# Patient Record
Sex: MALE | Race: BLACK OR AFRICAN AMERICAN | NOT HISPANIC OR LATINO | URBAN - METROPOLITAN AREA
[De-identification: names, ages, dates, MRNs, and addresses within clinical notes are randomized per-mention and may not be internally consistent; named-entity substitution may affect disease eponyms.]

---

## 2017-01-02 ENCOUNTER — EMERGENCY (EMERGENCY)
Facility: HOSPITAL | Age: 32
LOS: 0 days | Discharge: HOME | End: 2017-01-02
Attending: EMERGENCY MEDICINE
Payer: COMMERCIAL

## 2017-01-02 VITALS
TEMPERATURE: 98 F | DIASTOLIC BLOOD PRESSURE: 92 MMHG | RESPIRATION RATE: 17 BRPM | HEART RATE: 75 BPM | HEIGHT: 74 IN | SYSTOLIC BLOOD PRESSURE: 153 MMHG | OXYGEN SATURATION: 100 % | WEIGHT: 244.93 LBS

## 2017-01-02 VITALS
DIASTOLIC BLOOD PRESSURE: 99 MMHG | TEMPERATURE: 98 F | SYSTOLIC BLOOD PRESSURE: 151 MMHG | HEART RATE: 70 BPM | OXYGEN SATURATION: 100 % | RESPIRATION RATE: 17 BRPM

## 2017-01-02 DIAGNOSIS — M54.30 SCIATICA, UNSPECIFIED SIDE: ICD-10-CM

## 2017-01-02 DIAGNOSIS — M54.9 DORSALGIA, UNSPECIFIED: ICD-10-CM

## 2017-01-02 LAB
APPEARANCE UR: CLEAR — SIGNIFICANT CHANGE UP
BACTERIA # UR AUTO: ABNORMAL
BILIRUB UR-MCNC: NEGATIVE — SIGNIFICANT CHANGE UP
COLOR SPEC: YELLOW — SIGNIFICANT CHANGE UP
COMMENT - URINE: SIGNIFICANT CHANGE UP
DIFF PNL FLD: ABNORMAL
EPI CELLS # UR: SIGNIFICANT CHANGE UP
GLUCOSE UR QL: NEGATIVE MG/DL — SIGNIFICANT CHANGE UP
HYALINE CASTS # UR AUTO: ABNORMAL /LPF
KETONES UR-MCNC: NEGATIVE — SIGNIFICANT CHANGE UP
LEUKOCYTE ESTERASE UR-ACNC: NEGATIVE — SIGNIFICANT CHANGE UP
NITRITE UR-MCNC: NEGATIVE — SIGNIFICANT CHANGE UP
PH UR: 6 — SIGNIFICANT CHANGE UP (ref 4.8–8)
PROT UR-MCNC: NEGATIVE MG/DL — SIGNIFICANT CHANGE UP
RBC CASTS # UR COMP ASSIST: SIGNIFICANT CHANGE UP /HPF (ref 0–4)
SP GR SPEC: 1.02 — SIGNIFICANT CHANGE UP (ref 1.01–1.02)
UROBILINOGEN FLD QL: NEGATIVE MG/DL — SIGNIFICANT CHANGE UP
WBC UR QL: SIGNIFICANT CHANGE UP

## 2017-01-02 PROCEDURE — 99285 EMERGENCY DEPT VISIT HI MDM: CPT

## 2017-01-02 PROCEDURE — 74176 CT ABD & PELVIS W/O CONTRAST: CPT | Mod: 26

## 2017-01-02 RX ORDER — DIAZEPAM 5 MG
1 TABLET ORAL
Qty: 7 | Refills: 0 | OUTPATIENT
Start: 2017-01-02 | End: 2017-01-09

## 2017-01-02 RX ORDER — KETOROLAC TROMETHAMINE 30 MG/ML
60 SYRINGE (ML) INJECTION ONCE
Qty: 0 | Refills: 0 | Status: DISCONTINUED | OUTPATIENT
Start: 2017-01-02 | End: 2017-01-02

## 2017-01-02 RX ORDER — ACETAMINOPHEN 500 MG
1 TABLET ORAL
Qty: 21 | Refills: 0 | OUTPATIENT
Start: 2017-01-02 | End: 2017-01-09

## 2017-01-02 RX ADMIN — Medication 60 MILLIGRAM(S): at 07:00

## 2017-01-02 RX ADMIN — Medication 60 MILLIGRAM(S): at 05:27
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
## 2017-01-02 NOTE — ED PROVIDER NOTE - MEDICAL DECISION MAKING DETAILS
Patient with sciatica. Labs and imaging reviewed. Patient received toradol and valium with improvement of symptoms. Patient is now discharged with care instructions. Patient is to follow up with pmd. Discussed results and outcome of testing with the patient.  Patient advised to please follow up with their primary care doctor within the next 24 hours and return to the Emergency Department for worsening symptoms or any other concerns.  Patient advised that their doctor may call  to follow up on the specific results of the tests performed today in the emergency department.
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
## 2017-01-03 LAB
CULTURE RESULTS: SIGNIFICANT CHANGE UP
SPECIMEN SOURCE: SIGNIFICANT CHANGE UP

## 2017-05-19 NOTE — ED PROVIDER NOTE - CARDIAC, MLM
Post Op Day 1 Ortho Note    Status Post Nerve Block:  Type of Nerve Block: Left Fascia Iliaca  Single Injection Nerve Block    Post op review: No evidence of immediate block related complications, No paresthesia noted, Able to plantar flex foot, Able to do Normal rate, regular rhythm.  Heart sounds S1, S2.  No murmurs, rubs or gallops.

## 2017-09-29 ENCOUNTER — OUTPATIENT (OUTPATIENT)
Dept: OUTPATIENT SERVICES | Facility: HOSPITAL | Age: 32
LOS: 1 days | End: 2017-09-29
Payer: COMMERCIAL

## 2017-09-29 VITALS
TEMPERATURE: 99 F | HEART RATE: 69 BPM | DIASTOLIC BLOOD PRESSURE: 98 MMHG | HEIGHT: 73 IN | SYSTOLIC BLOOD PRESSURE: 142 MMHG | WEIGHT: 248.24 LBS | RESPIRATION RATE: 16 BRPM

## 2017-09-29 DIAGNOSIS — T14.8 OTHER INJURY OF UNSPECIFIED BODY REGION: ICD-10-CM

## 2017-09-29 DIAGNOSIS — I10 ESSENTIAL (PRIMARY) HYPERTENSION: ICD-10-CM

## 2017-09-29 DIAGNOSIS — S62.614A DISPLACED FRACTURE OF PROXIMAL PHALANX OF RIGHT RING FINGER, INITIAL ENCOUNTER FOR CLOSED FRACTURE: ICD-10-CM

## 2017-09-29 DIAGNOSIS — Z98.890 OTHER SPECIFIED POSTPROCEDURAL STATES: Chronic | ICD-10-CM

## 2017-09-29 LAB
BUN SERPL-MCNC: 8 MG/DL — SIGNIFICANT CHANGE UP (ref 7–23)
CALCIUM SERPL-MCNC: 9.4 MG/DL — SIGNIFICANT CHANGE UP (ref 8.4–10.5)
CHLORIDE SERPL-SCNC: 104 MMOL/L — SIGNIFICANT CHANGE UP (ref 98–107)
CO2 SERPL-SCNC: 25 MMOL/L — SIGNIFICANT CHANGE UP (ref 22–31)
CREAT SERPL-MCNC: 0.84 MG/DL — SIGNIFICANT CHANGE UP (ref 0.5–1.3)
GLUCOSE SERPL-MCNC: 82 MG/DL — SIGNIFICANT CHANGE UP (ref 70–99)
HCT VFR BLD CALC: 43.2 % — SIGNIFICANT CHANGE UP (ref 39–50)
HGB BLD-MCNC: 13.6 G/DL — SIGNIFICANT CHANGE UP (ref 13–17)
MCHC RBC-ENTMCNC: 26 PG — LOW (ref 27–34)
MCHC RBC-ENTMCNC: 31.5 % — LOW (ref 32–36)
MCV RBC AUTO: 82.4 FL — SIGNIFICANT CHANGE UP (ref 80–100)
NRBC # FLD: 0 — SIGNIFICANT CHANGE UP
PLATELET # BLD AUTO: 277 K/UL — SIGNIFICANT CHANGE UP (ref 150–400)
PMV BLD: 9.7 FL — SIGNIFICANT CHANGE UP (ref 7–13)
POTASSIUM SERPL-MCNC: 3.8 MMOL/L — SIGNIFICANT CHANGE UP (ref 3.5–5.3)
POTASSIUM SERPL-SCNC: 3.8 MMOL/L — SIGNIFICANT CHANGE UP (ref 3.5–5.3)
RBC # BLD: 5.24 M/UL — SIGNIFICANT CHANGE UP (ref 4.2–5.8)
RBC # FLD: 13.8 % — SIGNIFICANT CHANGE UP (ref 10.3–14.5)
SODIUM SERPL-SCNC: 141 MMOL/L — SIGNIFICANT CHANGE UP (ref 135–145)
WBC # BLD: 6.33 K/UL — SIGNIFICANT CHANGE UP (ref 3.8–10.5)
WBC # FLD AUTO: 6.33 K/UL — SIGNIFICANT CHANGE UP (ref 3.8–10.5)

## 2017-09-29 PROCEDURE — 93010 ELECTROCARDIOGRAM REPORT: CPT
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
## 2017-09-29 NOTE — H&P PST ADULT - PROBLEM SELECTOR PLAN 1
Scheduled for open reduction internal fixation on  10/2/17.   Labs pending,  EKG in chart,  preop instructions provided to pt.

## 2017-09-29 NOTE — H&P PST ADULT - ASSESSMENT
32yo male presents with right ring finger proximal phalanx fracture, scheduled for open reduction internal fixation on  10/2/17

## 2017-09-29 NOTE — H&P PST ADULT - NEGATIVE ENMT SYMPTOMS
no nasal congestion/no recurrent cold sores/no sinus symptoms/no hearing difficulty/no ear pain/no nasal discharge/no nasal obstruction/no post-nasal discharge/no vertigo

## 2017-09-29 NOTE — H&P PST ADULT - NSANTHOSAYNRD_GEN_A_CORE
never tested/No. SANDRA screening performed.  STOP BANG Legend: 0-2 = LOW Risk; 3-4 = INTERMEDIATE Risk; 5-8 = HIGH Risk

## 2017-09-29 NOTE — H&P PST ADULT - PROBLEM SELECTOR PLAN 2
pt with h/o HTN but does not take his losartan regularly.  Pt's bp 146/100 in PST  EKG in chart  Discussed with dr. Arriaga

## 2017-10-02 ENCOUNTER — OUTPATIENT (OUTPATIENT)
Dept: OUTPATIENT SERVICES | Facility: HOSPITAL | Age: 32
LOS: 1 days | Discharge: HOME | End: 2017-10-02

## 2017-10-02 ENCOUNTER — TRANSCRIPTION ENCOUNTER (OUTPATIENT)
Age: 32
End: 2017-10-02

## 2017-10-02 VITALS
HEIGHT: 73 IN | SYSTOLIC BLOOD PRESSURE: 155 MMHG | TEMPERATURE: 97 F | RESPIRATION RATE: 16 BRPM | WEIGHT: 248.24 LBS | OXYGEN SATURATION: 98 % | DIASTOLIC BLOOD PRESSURE: 92 MMHG | HEART RATE: 66 BPM

## 2017-10-02 VITALS — OXYGEN SATURATION: 98 % | HEART RATE: 62 BPM

## 2017-10-02 DIAGNOSIS — Z98.890 OTHER SPECIFIED POSTPROCEDURAL STATES: Chronic | ICD-10-CM

## 2017-10-02 DIAGNOSIS — S62.614A DISPLACED FRACTURE OF PROXIMAL PHALANX OF RIGHT RING FINGER, INITIAL ENCOUNTER FOR CLOSED FRACTURE: ICD-10-CM

## 2017-10-02 RX ORDER — HYDROMORPHONE HYDROCHLORIDE 2 MG/ML
1 INJECTION INTRAMUSCULAR; INTRAVENOUS; SUBCUTANEOUS
Qty: 30 | Refills: 0 | OUTPATIENT
Start: 2017-10-02 | End: 2017-10-07

## 2017-10-02 RX ORDER — ONDANSETRON 8 MG/1
1 TABLET, FILM COATED ORAL
Qty: 15 | Refills: 0 | OUTPATIENT
Start: 2017-10-02 | End: 2017-10-07

## 2017-10-02 NOTE — ASU DISCHARGE PLAN (ADULT/PEDIATRIC). - NOTIFY
Fever greater than 101/Pain not relieved by Medications/Swelling that continues/Numbness, color, or temperature change to extremity/Bleeding that does not stop

## 2017-12-10 ENCOUNTER — EMERGENCY (EMERGENCY)
Facility: HOSPITAL | Age: 32
LOS: 0 days | Discharge: LEFT BEFORE DISCHARGE | End: 2017-12-11

## 2017-12-10 DIAGNOSIS — Z98.890 OTHER SPECIFIED POSTPROCEDURAL STATES: Chronic | ICD-10-CM

## 2017-12-11 ENCOUNTER — EMERGENCY (EMERGENCY)
Facility: HOSPITAL | Age: 32
LOS: 0 days | Discharge: HOME | End: 2017-12-11
Attending: EMERGENCY MEDICINE
Payer: COMMERCIAL

## 2017-12-11 VITALS
WEIGHT: 238.98 LBS | RESPIRATION RATE: 16 BRPM | HEIGHT: 74 IN | DIASTOLIC BLOOD PRESSURE: 98 MMHG | OXYGEN SATURATION: 100 % | SYSTOLIC BLOOD PRESSURE: 156 MMHG | TEMPERATURE: 99 F | HEART RATE: 91 BPM

## 2017-12-11 VITALS
SYSTOLIC BLOOD PRESSURE: 154 MMHG | RESPIRATION RATE: 18 BRPM | HEART RATE: 79 BPM | TEMPERATURE: 98 F | WEIGHT: 238.1 LBS | DIASTOLIC BLOOD PRESSURE: 95 MMHG | HEIGHT: 74 IN

## 2017-12-11 DIAGNOSIS — Z98.890 OTHER SPECIFIED POSTPROCEDURAL STATES: Chronic | ICD-10-CM

## 2017-12-11 PROCEDURE — 73562 X-RAY EXAM OF KNEE 3: CPT | Mod: 26,LT

## 2017-12-11 PROCEDURE — 73130 X-RAY EXAM OF HAND: CPT | Mod: 26,RT

## 2017-12-11 PROCEDURE — 99284 EMERGENCY DEPT VISIT MOD MDM: CPT

## 2017-12-11 NOTE — ED PROVIDER NOTE - CARE PLAN
Principal Discharge DX:	Sprain of lateral collateral ligament of left knee, initial encounter Principal Discharge DX:	Sprain of lateral collateral ligament of left knee, initial encounter  Secondary Diagnosis:	Contusion of right hand, initial encounter  Secondary Diagnosis:	Lumbar strain, initial encounter

## 2017-12-11 NOTE — ED PROVIDER NOTE - MUSCULOSKELETAL, MLM
Spine appears normal, + bilateral paravertebral mid to distal lumbar tenderness, range of motion is not limited, left lateral knee and right lateral hand mild tenderness

## 2017-12-11 NOTE — ED ADULT TRIAGE NOTE - CHIEF COMPLAINT QUOTE
pt complaining of left hip, knee pain, back and right hand pain. pt states he slipped and fell last night.  pt denies head injury.

## 2017-12-11 NOTE — ED PROVIDER NOTE - OBJECTIVE STATEMENT
= Patient visited ED last night however did not wait for medical treatment because "it took too long."

## 2017-12-12 DIAGNOSIS — M25.562 PAIN IN LEFT KNEE: ICD-10-CM

## 2017-12-12 DIAGNOSIS — M79.641 PAIN IN RIGHT HAND: ICD-10-CM
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
## 2017-12-12 DIAGNOSIS — K25.3 ACUTE GASTRIC ULCER WITHOUT HEMORRHAGE OR PERFORATION: ICD-10-CM

## 2017-12-12 DIAGNOSIS — Z79.1 LONG TERM (CURRENT) USE OF NON-STEROIDAL ANTI-INFLAMMATORIES (NSAID): ICD-10-CM

## 2017-12-12 DIAGNOSIS — S39.012A STRAIN OF MUSCLE, FASCIA AND TENDON OF LOWER BACK, INITIAL ENCOUNTER: ICD-10-CM

## 2017-12-12 DIAGNOSIS — S83.422A SPRAIN OF LATERAL COLLATERAL LIGAMENT OF LEFT KNEE, INITIAL ENCOUNTER: ICD-10-CM

## 2017-12-12 DIAGNOSIS — S60.221A CONTUSION OF RIGHT HAND, INITIAL ENCOUNTER: ICD-10-CM
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
## 2017-12-12 DIAGNOSIS — I10 ESSENTIAL (PRIMARY) HYPERTENSION: ICD-10-CM

## 2017-12-18 ENCOUNTER — EMERGENCY (EMERGENCY)
Facility: HOSPITAL | Age: 32
LOS: 0 days | Discharge: HOME | End: 2017-12-19
Attending: EMERGENCY MEDICINE
Payer: COMMERCIAL

## 2017-12-18 VITALS
HEIGHT: 74 IN | OXYGEN SATURATION: 99 % | TEMPERATURE: 98 F | SYSTOLIC BLOOD PRESSURE: 164 MMHG | DIASTOLIC BLOOD PRESSURE: 97 MMHG | RESPIRATION RATE: 19 BRPM | WEIGHT: 240.97 LBS | HEART RATE: 67 BPM

## 2017-12-18 DIAGNOSIS — J32.9 CHRONIC SINUSITIS, UNSPECIFIED: ICD-10-CM

## 2017-12-18 DIAGNOSIS — I10 ESSENTIAL (PRIMARY) HYPERTENSION: ICD-10-CM

## 2017-12-18 DIAGNOSIS — K21.9 GASTRO-ESOPHAGEAL REFLUX DISEASE WITHOUT ESOPHAGITIS: ICD-10-CM

## 2017-12-18 DIAGNOSIS — R51 HEADACHE: ICD-10-CM

## 2017-12-18 DIAGNOSIS — R20.2 PARESTHESIA OF SKIN: ICD-10-CM

## 2017-12-18 DIAGNOSIS — Z98.890 OTHER SPECIFIED POSTPROCEDURAL STATES: Chronic | ICD-10-CM

## 2017-12-18 LAB
APTT BLD: 33.4 SEC — SIGNIFICANT CHANGE UP (ref 27.5–37.4)
INR BLD: 1.19 RATIO — HIGH (ref 0.88–1.16)
PROTHROM AB SERPL-ACNC: 13 SEC — HIGH (ref 9.8–12.7)

## 2017-12-18 PROCEDURE — 70450 CT HEAD/BRAIN W/O DYE: CPT | Mod: 26

## 2017-12-18 PROCEDURE — 99285 EMERGENCY DEPT VISIT HI MDM: CPT

## 2017-12-18 RX ORDER — ACETAMINOPHEN 500 MG
650 TABLET ORAL ONCE
Qty: 0 | Refills: 0 | Status: COMPLETED | OUTPATIENT
Start: 2017-12-18 | End: 2017-12-18

## 2017-12-18 NOTE — ED PROVIDER NOTE - MEDICAL DECISION MAKING DETAILS
Pt improved in ED, VSS in NAD.  Given first dose of abx in ED.  Discussed results and outcome of testing with the patient, given copy as well.  Patient advised to please follow up with their primary care doctor within the next 24 hours and return to the Emergency Department for worsening symptoms or any other concerns.  Patient advised that their doctor may call  to follow up on the specific results of the tests performed today in the emergency department.

## 2017-12-18 NOTE — ED ADULT NURSE NOTE - OBJECTIVE STATEMENT
Patient to the ED with tingling in fingers tips, beginning today, associated with headache, and throbbing to L eye and chest discomfort.  Patient has hx of HTN and acid reflux.

## 2017-12-18 NOTE — ED PROVIDER NOTE - OBJECTIVE STATEMENT
Pertinent PMH/PSH/FHx/SHx and Review of Systems contained within:    31yo M w PMH of HTN & GERD presents to ED for eval of HA & paresthesias.  Pt states he was going to  few days ago and noted hyperventilation w paresthesias.  Pt had panic attack, sx resolved.  Pt states he then noted L sided HA, not associated w panic attack.  Denies fever, chills, syncope, trauma, vision changes, eye tearing, CP, SOB.  Pt admits he has not been sleeping well due to grief. Pertinent PMH/PSH/FHx/SHx and Review of Systems contained within:    31yo M w PMH of HTN & GERD presents to ED for eval of HA & paresthesias.  Pt states he was going to  few days ago and noted hyperventilation w paresthesias.  Pt had panic attack, sx resolved.  Pt states he then noted L sided HA, not associated w panic attack.  Denies fever, chills, syncope, trauma, vision changes, eye tearing, CP, SOB.  Pt admits he has not been sleeping well due to grief.  Denies EtOH or substance abuse.    No fever/chills, No photophobia/eye pain/changes in vision, No ear pain/sore throat/dysphagia, No chest pain, no SOB/cough/wheeze/stridor, No abdominal pain, No N/V/D, no dysuria/frequency/discharge, No neck/back pain, no rash

## 2017-12-18 NOTE — ED PROVIDER NOTE - PHYSICAL EXAMINATION
Gen: Alert, NAD, speaking in complete sentences  Head: NC, AT, EOMI, normal lids/conjunctiva  ENT: normal hearing, patent oropharynx, MMM  Neck: supple, no tenderness/meningismus, FROM  Pulm: Bilateral clear BS, normal resp effort, no wheeze/stridor/retractions  CV: RRR, no M/R/G, +dist pulses  Abd: soft, NT/ND, +BS, no guarding/rebound tenderness  Mskel: no edema/erythema/cyanosis  Skin: no rash  Neuro: AAOx3, no sensory/motor deficits, CN 2-12 intact

## 2017-12-19 LAB
ALBUMIN SERPL ELPH-MCNC: 3.9 G/DL — SIGNIFICANT CHANGE UP (ref 3.3–5)
ALP SERPL-CCNC: 82 U/L — SIGNIFICANT CHANGE UP (ref 40–120)
ALT FLD-CCNC: 51 U/L — SIGNIFICANT CHANGE UP (ref 12–78)
ANION GAP SERPL CALC-SCNC: 6 MMOL/L — SIGNIFICANT CHANGE UP (ref 5–17)
AST SERPL-CCNC: 27 U/L — SIGNIFICANT CHANGE UP (ref 15–37)
BASOPHILS # BLD AUTO: 0.1 K/UL — SIGNIFICANT CHANGE UP (ref 0–0.2)
BASOPHILS NFR BLD AUTO: 1.4 % — SIGNIFICANT CHANGE UP (ref 0–2)
BILIRUB SERPL-MCNC: 1.2 MG/DL — SIGNIFICANT CHANGE UP (ref 0.2–1.2)
BUN SERPL-MCNC: 10 MG/DL — SIGNIFICANT CHANGE UP (ref 7–23)
CALCIUM SERPL-MCNC: 8.8 MG/DL — SIGNIFICANT CHANGE UP (ref 8.5–10.1)
CHLORIDE SERPL-SCNC: 108 MMOL/L — SIGNIFICANT CHANGE UP (ref 96–108)
CK MB BLD-MCNC: 0.4 % — SIGNIFICANT CHANGE UP (ref 0–3.5)
CK MB CFR SERPL CALC: 1.2 NG/ML — SIGNIFICANT CHANGE UP (ref 0.5–3.6)
CK SERPL-CCNC: 333 U/L — HIGH (ref 26–308)
CO2 SERPL-SCNC: 27 MMOL/L — SIGNIFICANT CHANGE UP (ref 22–31)
CREAT SERPL-MCNC: 0.85 MG/DL — SIGNIFICANT CHANGE UP (ref 0.5–1.3)
EOSINOPHIL # BLD AUTO: 0.1 K/UL — SIGNIFICANT CHANGE UP (ref 0–0.5)
EOSINOPHIL NFR BLD AUTO: 1 % — SIGNIFICANT CHANGE UP (ref 0–6)
GLUCOSE SERPL-MCNC: 92 MG/DL — SIGNIFICANT CHANGE UP (ref 70–99)
HCT VFR BLD CALC: 42.5 % — SIGNIFICANT CHANGE UP (ref 39–50)
HGB BLD-MCNC: 14.2 G/DL — SIGNIFICANT CHANGE UP (ref 13–17)
LYMPHOCYTES # BLD AUTO: 2.8 K/UL — SIGNIFICANT CHANGE UP (ref 1–3.3)
LYMPHOCYTES # BLD AUTO: 42 % — SIGNIFICANT CHANGE UP (ref 13–44)
MCHC RBC-ENTMCNC: 26.9 PG — LOW (ref 27–34)
MCHC RBC-ENTMCNC: 33.4 GM/DL — SIGNIFICANT CHANGE UP (ref 32–36)
MCV RBC AUTO: 80.5 FL — SIGNIFICANT CHANGE UP (ref 80–100)
MONOCYTES # BLD AUTO: 0.6 K/UL — SIGNIFICANT CHANGE UP (ref 0–0.9)
MONOCYTES NFR BLD AUTO: 8.3 % — SIGNIFICANT CHANGE UP (ref 2–14)
NEUTROPHILS # BLD AUTO: 3.2 K/UL — SIGNIFICANT CHANGE UP (ref 1.8–7.4)
NEUTROPHILS NFR BLD AUTO: 47.3 % — SIGNIFICANT CHANGE UP (ref 43–77)
PLATELET # BLD AUTO: 264 K/UL — SIGNIFICANT CHANGE UP (ref 150–400)
POTASSIUM SERPL-MCNC: 3.7 MMOL/L — SIGNIFICANT CHANGE UP (ref 3.5–5.3)
POTASSIUM SERPL-SCNC: 3.7 MMOL/L — SIGNIFICANT CHANGE UP (ref 3.5–5.3)
PROT SERPL-MCNC: 7.9 GM/DL — SIGNIFICANT CHANGE UP (ref 6–8.3)
RBC # BLD: 5.28 M/UL — SIGNIFICANT CHANGE UP (ref 4.2–5.8)
RBC # FLD: 12.8 % — SIGNIFICANT CHANGE UP (ref 11–15)
SODIUM SERPL-SCNC: 141 MMOL/L — SIGNIFICANT CHANGE UP (ref 135–145)
TROPONIN I SERPL-MCNC: <.015 NG/ML — SIGNIFICANT CHANGE UP (ref 0.01–0.04)
WBC # BLD: 6.7 K/UL — SIGNIFICANT CHANGE UP (ref 3.8–10.5)
WBC # FLD AUTO: 6.7 K/UL — SIGNIFICANT CHANGE UP (ref 3.8–10.5)

## 2017-12-19 PROCEDURE — 93010 ELECTROCARDIOGRAM REPORT: CPT

## 2019-05-08 ENCOUNTER — TRANSCRIPTION ENCOUNTER (OUTPATIENT)
Age: 34
End: 2019-05-08

## 2019-05-08 ENCOUNTER — INPATIENT (INPATIENT)
Facility: HOSPITAL | Age: 34
LOS: 2 days | Discharge: HOME | End: 2019-05-11
Attending: SURGERY | Admitting: SURGERY
Payer: COMMERCIAL

## 2019-05-08 VITALS
RESPIRATION RATE: 18 BRPM | HEART RATE: 85 BPM | HEIGHT: 73 IN | SYSTOLIC BLOOD PRESSURE: 172 MMHG | DIASTOLIC BLOOD PRESSURE: 105 MMHG | WEIGHT: 263.89 LBS | OXYGEN SATURATION: 100 % | TEMPERATURE: 99 F

## 2019-05-08 DIAGNOSIS — Z98.890 OTHER SPECIFIED POSTPROCEDURAL STATES: Chronic | ICD-10-CM

## 2019-05-08 PROBLEM — I10 ESSENTIAL (PRIMARY) HYPERTENSION: Chronic | Status: ACTIVE | Noted: 2017-09-29

## 2019-05-08 PROBLEM — K21.9 GASTRO-ESOPHAGEAL REFLUX DISEASE WITHOUT ESOPHAGITIS: Chronic | Status: ACTIVE | Noted: 2017-09-29

## 2019-05-08 LAB
ALBUMIN SERPL ELPH-MCNC: 4.4 G/DL — SIGNIFICANT CHANGE UP (ref 3.3–5)
ALP SERPL-CCNC: 91 U/L — SIGNIFICANT CHANGE UP (ref 40–120)
ALT FLD-CCNC: 33 U/L — SIGNIFICANT CHANGE UP (ref 12–78)
AMYLASE P1 CFR SERPL: 72 U/L — SIGNIFICANT CHANGE UP (ref 25–115)
ANION GAP SERPL CALC-SCNC: 9 MMOL/L — SIGNIFICANT CHANGE UP (ref 5–17)
APTT BLD: 30.8 SEC — SIGNIFICANT CHANGE UP (ref 28.5–37)
AST SERPL-CCNC: 16 U/L — SIGNIFICANT CHANGE UP (ref 15–37)
BASOPHILS # BLD AUTO: 0.04 K/UL — SIGNIFICANT CHANGE UP (ref 0–0.2)
BASOPHILS NFR BLD AUTO: 0.4 % — SIGNIFICANT CHANGE UP (ref 0–2)
BILIRUB SERPL-MCNC: 1.4 MG/DL — HIGH (ref 0.2–1.2)
BLD GP AB SCN SERPL QL: SIGNIFICANT CHANGE UP
BUN SERPL-MCNC: 8 MG/DL — SIGNIFICANT CHANGE UP (ref 7–23)
CALCIUM SERPL-MCNC: 10.1 MG/DL — SIGNIFICANT CHANGE UP (ref 8.5–10.1)
CHLORIDE SERPL-SCNC: 105 MMOL/L — SIGNIFICANT CHANGE UP (ref 96–108)
CK MB BLD-MCNC: <0.3 % — SIGNIFICANT CHANGE UP (ref 0–3.5)
CK MB CFR SERPL CALC: <1 NG/ML — SIGNIFICANT CHANGE UP (ref 0.5–3.6)
CK SERPL-CCNC: 304 U/L — SIGNIFICANT CHANGE UP (ref 26–308)
CO2 SERPL-SCNC: 23 MMOL/L — SIGNIFICANT CHANGE UP (ref 22–31)
CREAT SERPL-MCNC: 0.94 MG/DL — SIGNIFICANT CHANGE UP (ref 0.5–1.3)
EOSINOPHIL # BLD AUTO: 0.01 K/UL — SIGNIFICANT CHANGE UP (ref 0–0.5)
EOSINOPHIL NFR BLD AUTO: 0.1 % — SIGNIFICANT CHANGE UP (ref 0–6)
GLUCOSE SERPL-MCNC: 113 MG/DL — HIGH (ref 70–99)
HCT VFR BLD CALC: 47.1 % — SIGNIFICANT CHANGE UP (ref 39–50)
HGB BLD-MCNC: 15.5 G/DL — SIGNIFICANT CHANGE UP (ref 13–17)
IMM GRANULOCYTES NFR BLD AUTO: 0.2 % — SIGNIFICANT CHANGE UP (ref 0–1.5)
INR BLD: 1.18 RATIO — HIGH (ref 0.88–1.16)
LACTATE SERPL-SCNC: 1.9 MMOL/L — SIGNIFICANT CHANGE UP (ref 0.7–2)
LIDOCAIN IGE QN: 179 U/L — SIGNIFICANT CHANGE UP (ref 73–393)
LYMPHOCYTES # BLD AUTO: 2.42 K/UL — SIGNIFICANT CHANGE UP (ref 1–3.3)
LYMPHOCYTES # BLD AUTO: 22.3 % — SIGNIFICANT CHANGE UP (ref 13–44)
MCHC RBC-ENTMCNC: 25.4 PG — LOW (ref 27–34)
MCHC RBC-ENTMCNC: 32.9 GM/DL — SIGNIFICANT CHANGE UP (ref 32–36)
MCV RBC AUTO: 77.2 FL — LOW (ref 80–100)
MONOCYTES # BLD AUTO: 1.13 K/UL — HIGH (ref 0–0.9)
MONOCYTES NFR BLD AUTO: 10.4 % — SIGNIFICANT CHANGE UP (ref 2–14)
NEUTROPHILS # BLD AUTO: 7.21 K/UL — SIGNIFICANT CHANGE UP (ref 1.8–7.4)
NEUTROPHILS NFR BLD AUTO: 66.6 % — SIGNIFICANT CHANGE UP (ref 43–77)
NRBC # BLD: 0 /100 WBCS — SIGNIFICANT CHANGE UP (ref 0–0)
PLATELET # BLD AUTO: 337 K/UL — SIGNIFICANT CHANGE UP (ref 150–400)
POTASSIUM SERPL-MCNC: 3.4 MMOL/L — LOW (ref 3.5–5.3)
POTASSIUM SERPL-SCNC: 3.4 MMOL/L — LOW (ref 3.5–5.3)
PROT SERPL-MCNC: 8.9 GM/DL — HIGH (ref 6–8.3)
PROTHROM AB SERPL-ACNC: 13.3 SEC — HIGH (ref 10–12.9)
RBC # BLD: 6.1 M/UL — HIGH (ref 4.2–5.8)
RBC # FLD: 13.7 % — SIGNIFICANT CHANGE UP (ref 10.3–14.5)
SODIUM SERPL-SCNC: 137 MMOL/L — SIGNIFICANT CHANGE UP (ref 135–145)
TROPONIN I SERPL-MCNC: <.015 NG/ML — SIGNIFICANT CHANGE UP (ref 0.01–0.04)
WBC # BLD: 10.83 K/UL — HIGH (ref 3.8–10.5)
WBC # FLD AUTO: 10.83 K/UL — HIGH (ref 3.8–10.5)

## 2019-05-08 PROCEDURE — 93010 ELECTROCARDIOGRAM REPORT: CPT

## 2019-05-08 PROCEDURE — 71275 CT ANGIOGRAPHY CHEST: CPT | Mod: 26

## 2019-05-08 PROCEDURE — 71045 X-RAY EXAM CHEST 1 VIEW: CPT | Mod: 26

## 2019-05-08 PROCEDURE — 99285 EMERGENCY DEPT VISIT HI MDM: CPT

## 2019-05-08 PROCEDURE — 74174 CTA ABD&PLVS W/CONTRAST: CPT | Mod: 26

## 2019-05-08 RX ORDER — OMEPRAZOLE 10 MG/1
0 CAPSULE, DELAYED RELEASE ORAL
Qty: 0 | Refills: 0 | COMMUNITY

## 2019-05-08 RX ORDER — MORPHINE SULFATE 50 MG/1
4 CAPSULE, EXTENDED RELEASE ORAL ONCE
Qty: 0 | Refills: 0 | Status: DISCONTINUED | OUTPATIENT
Start: 2019-05-08 | End: 2019-05-08

## 2019-05-08 RX ORDER — ONDANSETRON 8 MG/1
8 TABLET, FILM COATED ORAL ONCE
Qty: 0 | Refills: 0 | Status: COMPLETED | OUTPATIENT
Start: 2019-05-08 | End: 2019-05-08

## 2019-05-08 RX ORDER — PANTOPRAZOLE SODIUM 20 MG/1
40 TABLET, DELAYED RELEASE ORAL ONCE
Qty: 0 | Refills: 0 | Status: COMPLETED | OUTPATIENT
Start: 2019-05-08 | End: 2019-05-08

## 2019-05-08 RX ORDER — HYDROMORPHONE HYDROCHLORIDE 2 MG/ML
1 INJECTION INTRAMUSCULAR; INTRAVENOUS; SUBCUTANEOUS ONCE
Qty: 0 | Refills: 0 | Status: DISCONTINUED | OUTPATIENT
Start: 2019-05-08 | End: 2019-05-08

## 2019-05-08 RX ORDER — LOSARTAN POTASSIUM 100 MG/1
50 TABLET, FILM COATED ORAL
Qty: 0 | Refills: 0 | COMMUNITY

## 2019-05-08 RX ORDER — AZITHROMYCIN 500 MG/1
0 TABLET, FILM COATED ORAL
Qty: 0 | Refills: 0 | COMMUNITY

## 2019-05-08 RX ORDER — SODIUM CHLORIDE 9 MG/ML
1000 INJECTION INTRAMUSCULAR; INTRAVENOUS; SUBCUTANEOUS ONCE
Qty: 0 | Refills: 0 | Status: COMPLETED | OUTPATIENT
Start: 2019-05-08 | End: 2019-05-08

## 2019-05-08 RX ORDER — PANTOPRAZOLE SODIUM 20 MG/1
0 TABLET, DELAYED RELEASE ORAL
Qty: 0 | Refills: 0 | COMMUNITY

## 2019-05-08 RX ADMIN — HYDROMORPHONE HYDROCHLORIDE 1 MILLIGRAM(S): 2 INJECTION INTRAMUSCULAR; INTRAVENOUS; SUBCUTANEOUS at 16:33

## 2019-05-08 RX ADMIN — PANTOPRAZOLE SODIUM 40 MILLIGRAM(S): 20 TABLET, DELAYED RELEASE ORAL at 16:34

## 2019-05-08 RX ADMIN — HYDROMORPHONE HYDROCHLORIDE 1 MILLIGRAM(S): 2 INJECTION INTRAMUSCULAR; INTRAVENOUS; SUBCUTANEOUS at 19:50

## 2019-05-08 RX ADMIN — MORPHINE SULFATE 4 MILLIGRAM(S): 50 CAPSULE, EXTENDED RELEASE ORAL at 21:45

## 2019-05-08 RX ADMIN — Medication 30 MILLILITER(S): at 16:34

## 2019-05-08 RX ADMIN — SODIUM CHLORIDE 500 MILLILITER(S): 9 INJECTION INTRAMUSCULAR; INTRAVENOUS; SUBCUTANEOUS at 16:37

## 2019-05-08 RX ADMIN — ONDANSETRON 8 MILLIGRAM(S): 8 TABLET, FILM COATED ORAL at 16:34

## 2019-05-08 RX ADMIN — MORPHINE SULFATE 4 MILLIGRAM(S): 50 CAPSULE, EXTENDED RELEASE ORAL at 21:37

## 2019-05-08 NOTE — ED PROVIDER NOTE - OBJECTIVE STATEMENT
33 years old male here with wife c/o nausea vomiting, epigastric abd pain radiates to mid chest and back since last night. Pt denies recent hx of trauma, headache, dizziness, blurred visions, light sensitivities, cough, sob, chest pain, fever, chills, dysuria or irregular bowel movements.

## 2019-05-08 NOTE — ED ADULT TRIAGE NOTE - CHIEF COMPLAINT QUOTE
c/o upper abdominal pain since last night with n/v/d seen at pmd for same c/o referred to er for treatment and eval hx gastric ulcer

## 2019-05-08 NOTE — ED PROVIDER NOTE - PROGRESS NOTE DETAILS
L[place]elle: patient signed out by Dr. Weeks pending CT. CT and US reveiwed. patient admitted to s for further management.

## 2019-05-08 NOTE — ED PROVIDER NOTE - CONSTITUTIONAL, MLM
normal... Well appearing, well nourished, awake, alert, oriented to person, place, time/situation and in no apparent distress. Speaking in clear full sentences no nasal flaring no shoulders retractions no diaphoresis, not holding her head/chest/abdomen

## 2019-05-09 ENCOUNTER — TRANSCRIPTION ENCOUNTER (OUTPATIENT)
Age: 34
End: 2019-05-09

## 2019-05-09 ENCOUNTER — RESULT REVIEW (OUTPATIENT)
Age: 34
End: 2019-05-09

## 2019-05-09 DIAGNOSIS — Z87.81 PERSONAL HISTORY OF (HEALED) TRAUMATIC FRACTURE: Chronic | ICD-10-CM

## 2019-05-09 DIAGNOSIS — K81.9 CHOLECYSTITIS, UNSPECIFIED: ICD-10-CM

## 2019-05-09 DIAGNOSIS — K25.7 CHRONIC GASTRIC ULCER WITHOUT HEMORRHAGE OR PERFORATION: ICD-10-CM

## 2019-05-09 DIAGNOSIS — Z29.9 ENCOUNTER FOR PROPHYLACTIC MEASURES, UNSPECIFIED: ICD-10-CM

## 2019-05-09 DIAGNOSIS — I10 ESSENTIAL (PRIMARY) HYPERTENSION: ICD-10-CM

## 2019-05-09 LAB
ALBUMIN SERPL ELPH-MCNC: 3.4 G/DL — SIGNIFICANT CHANGE UP (ref 3.3–5)
ALP SERPL-CCNC: 79 U/L — SIGNIFICANT CHANGE UP (ref 40–120)
ALT FLD-CCNC: 48 U/L — SIGNIFICANT CHANGE UP (ref 12–78)
ANION GAP SERPL CALC-SCNC: 9 MMOL/L — SIGNIFICANT CHANGE UP (ref 5–17)
APPEARANCE UR: CLEAR — SIGNIFICANT CHANGE UP
AST SERPL-CCNC: 51 U/L — HIGH (ref 15–37)
BILIRUB DIRECT SERPL-MCNC: 0.32 MG/DL — HIGH (ref 0.05–0.2)
BILIRUB INDIRECT FLD-MCNC: 1.4 MG/DL — HIGH (ref 0.2–1)
BILIRUB SERPL-MCNC: 1.7 MG/DL — HIGH (ref 0.2–1.2)
BILIRUB UR-MCNC: NEGATIVE — SIGNIFICANT CHANGE UP
BUN SERPL-MCNC: 8 MG/DL — SIGNIFICANT CHANGE UP (ref 7–23)
CALCIUM SERPL-MCNC: 8.2 MG/DL — LOW (ref 8.5–10.1)
CHLORIDE SERPL-SCNC: 111 MMOL/L — HIGH (ref 96–108)
CO2 SERPL-SCNC: 22 MMOL/L — SIGNIFICANT CHANGE UP (ref 22–31)
COLOR SPEC: YELLOW — SIGNIFICANT CHANGE UP
CREAT SERPL-MCNC: 1.02 MG/DL — SIGNIFICANT CHANGE UP (ref 0.5–1.3)
DIFF PNL FLD: NEGATIVE — SIGNIFICANT CHANGE UP
GLUCOSE SERPL-MCNC: 105 MG/DL — HIGH (ref 70–99)
GLUCOSE UR QL: NEGATIVE MG/DL — SIGNIFICANT CHANGE UP
HCT VFR BLD CALC: 41.3 % — SIGNIFICANT CHANGE UP (ref 39–50)
HGB BLD-MCNC: 13.5 G/DL — SIGNIFICANT CHANGE UP (ref 13–17)
KETONES UR-MCNC: NEGATIVE — SIGNIFICANT CHANGE UP
LEUKOCYTE ESTERASE UR-ACNC: NEGATIVE — SIGNIFICANT CHANGE UP
MAGNESIUM SERPL-MCNC: 2.1 MG/DL — SIGNIFICANT CHANGE UP (ref 1.6–2.6)
MCHC RBC-ENTMCNC: 25.8 PG — LOW (ref 27–34)
MCHC RBC-ENTMCNC: 32.7 GM/DL — SIGNIFICANT CHANGE UP (ref 32–36)
MCV RBC AUTO: 78.8 FL — LOW (ref 80–100)
NITRITE UR-MCNC: NEGATIVE — SIGNIFICANT CHANGE UP
NRBC # BLD: 0 /100 WBCS — SIGNIFICANT CHANGE UP (ref 0–0)
PH UR: 5 — SIGNIFICANT CHANGE UP (ref 5–8)
PHOSPHATE SERPL-MCNC: 2.4 MG/DL — LOW (ref 2.5–4.5)
PLATELET # BLD AUTO: 268 K/UL — SIGNIFICANT CHANGE UP (ref 150–400)
POTASSIUM SERPL-MCNC: 3.6 MMOL/L — SIGNIFICANT CHANGE UP (ref 3.5–5.3)
POTASSIUM SERPL-SCNC: 3.6 MMOL/L — SIGNIFICANT CHANGE UP (ref 3.5–5.3)
PROT SERPL-MCNC: 6.7 GM/DL — SIGNIFICANT CHANGE UP (ref 6–8.3)
PROT UR-MCNC: NEGATIVE MG/DL — SIGNIFICANT CHANGE UP
RBC # BLD: 5.24 M/UL — SIGNIFICANT CHANGE UP (ref 4.2–5.8)
RBC # FLD: 14 % — SIGNIFICANT CHANGE UP (ref 10.3–14.5)
SODIUM SERPL-SCNC: 142 MMOL/L — SIGNIFICANT CHANGE UP (ref 135–145)
SP GR SPEC: 1.01 — SIGNIFICANT CHANGE UP (ref 1.01–1.02)
UROBILINOGEN FLD QL: NEGATIVE MG/DL — SIGNIFICANT CHANGE UP
WBC # BLD: 9.2 K/UL — SIGNIFICANT CHANGE UP (ref 3.8–10.5)
WBC # FLD AUTO: 9.2 K/UL — SIGNIFICANT CHANGE UP (ref 3.8–10.5)

## 2019-05-09 PROCEDURE — 76700 US EXAM ABDOM COMPLETE: CPT | Mod: 26

## 2019-05-09 PROCEDURE — 88304 TISSUE EXAM BY PATHOLOGIST: CPT | Mod: 26

## 2019-05-09 RX ORDER — OXYCODONE AND ACETAMINOPHEN 5; 325 MG/1; MG/1
1 TABLET ORAL EVERY 6 HOURS
Refills: 0 | Status: DISCONTINUED | OUTPATIENT
Start: 2019-05-09 | End: 2019-05-11

## 2019-05-09 RX ORDER — ONDANSETRON 8 MG/1
4 TABLET, FILM COATED ORAL EVERY 6 HOURS
Refills: 0 | Status: DISCONTINUED | OUTPATIENT
Start: 2019-05-09 | End: 2019-05-09

## 2019-05-09 RX ORDER — LOSARTAN POTASSIUM 100 MG/1
50 TABLET, FILM COATED ORAL DAILY
Refills: 0 | Status: DISCONTINUED | OUTPATIENT
Start: 2019-05-09 | End: 2019-05-11

## 2019-05-09 RX ORDER — ACETAMINOPHEN 500 MG
650 TABLET ORAL EVERY 6 HOURS
Refills: 0 | Status: DISCONTINUED | OUTPATIENT
Start: 2019-05-09 | End: 2019-05-09

## 2019-05-09 RX ORDER — ACETAMINOPHEN 500 MG
650 TABLET ORAL EVERY 6 HOURS
Refills: 0 | Status: DISCONTINUED | OUTPATIENT
Start: 2019-05-09 | End: 2019-05-11

## 2019-05-09 RX ORDER — HYDROMORPHONE HYDROCHLORIDE 2 MG/ML
1 INJECTION INTRAMUSCULAR; INTRAVENOUS; SUBCUTANEOUS
Refills: 0 | Status: DISCONTINUED | OUTPATIENT
Start: 2019-05-09 | End: 2019-05-09

## 2019-05-09 RX ORDER — POTASSIUM PHOSPHATE, MONOBASIC POTASSIUM PHOSPHATE, DIBASIC 236; 224 MG/ML; MG/ML
15 INJECTION, SOLUTION INTRAVENOUS ONCE
Refills: 0 | Status: COMPLETED | OUTPATIENT
Start: 2019-05-09 | End: 2019-05-09

## 2019-05-09 RX ORDER — MORPHINE SULFATE 50 MG/1
4 CAPSULE, EXTENDED RELEASE ORAL ONCE
Refills: 0 | Status: DISCONTINUED | OUTPATIENT
Start: 2019-05-09 | End: 2019-05-09

## 2019-05-09 RX ORDER — SODIUM CHLORIDE 9 MG/ML
1000 INJECTION, SOLUTION INTRAVENOUS
Refills: 0 | Status: DISCONTINUED | OUTPATIENT
Start: 2019-05-09 | End: 2019-05-11

## 2019-05-09 RX ORDER — POTASSIUM CHLORIDE 20 MEQ
10 PACKET (EA) ORAL
Refills: 0 | Status: COMPLETED | OUTPATIENT
Start: 2019-05-09 | End: 2019-05-09

## 2019-05-09 RX ORDER — SODIUM CHLORIDE 9 MG/ML
1000 INJECTION INTRAMUSCULAR; INTRAVENOUS; SUBCUTANEOUS ONCE
Refills: 0 | Status: COMPLETED | OUTPATIENT
Start: 2019-05-09 | End: 2019-05-09

## 2019-05-09 RX ORDER — MORPHINE SULFATE 50 MG/1
2 CAPSULE, EXTENDED RELEASE ORAL EVERY 4 HOURS
Refills: 0 | Status: DISCONTINUED | OUTPATIENT
Start: 2019-05-09 | End: 2019-05-11

## 2019-05-09 RX ORDER — ACETAMINOPHEN 500 MG
1000 TABLET ORAL ONCE
Refills: 0 | Status: COMPLETED | OUTPATIENT
Start: 2019-05-09 | End: 2019-05-09

## 2019-05-09 RX ORDER — PIPERACILLIN AND TAZOBACTAM 4; .5 G/20ML; G/20ML
3.38 INJECTION, POWDER, LYOPHILIZED, FOR SOLUTION INTRAVENOUS EVERY 8 HOURS
Refills: 0 | Status: COMPLETED | OUTPATIENT
Start: 2019-05-09 | End: 2019-05-10

## 2019-05-09 RX ORDER — PANTOPRAZOLE SODIUM 20 MG/1
40 TABLET, DELAYED RELEASE ORAL
Refills: 0 | Status: DISCONTINUED | OUTPATIENT
Start: 2019-05-09 | End: 2019-05-09

## 2019-05-09 RX ORDER — PIPERACILLIN AND TAZOBACTAM 4; .5 G/20ML; G/20ML
3.38 INJECTION, POWDER, LYOPHILIZED, FOR SOLUTION INTRAVENOUS EVERY 8 HOURS
Refills: 0 | Status: DISCONTINUED | OUTPATIENT
Start: 2019-05-09 | End: 2019-05-09

## 2019-05-09 RX ORDER — MORPHINE SULFATE 50 MG/1
2 CAPSULE, EXTENDED RELEASE ORAL EVERY 4 HOURS
Refills: 0 | Status: DISCONTINUED | OUTPATIENT
Start: 2019-05-09 | End: 2019-05-09

## 2019-05-09 RX ORDER — ONDANSETRON 8 MG/1
4 TABLET, FILM COATED ORAL EVERY 6 HOURS
Refills: 0 | Status: DISCONTINUED | OUTPATIENT
Start: 2019-05-09 | End: 2019-05-11

## 2019-05-09 RX ORDER — HEPARIN SODIUM 5000 [USP'U]/ML
5000 INJECTION INTRAVENOUS; SUBCUTANEOUS EVERY 12 HOURS
Refills: 0 | Status: DISCONTINUED | OUTPATIENT
Start: 2019-05-09 | End: 2019-05-09

## 2019-05-09 RX ORDER — PIPERACILLIN AND TAZOBACTAM 4; .5 G/20ML; G/20ML
3.38 INJECTION, POWDER, LYOPHILIZED, FOR SOLUTION INTRAVENOUS ONCE
Refills: 0 | Status: COMPLETED | OUTPATIENT
Start: 2019-05-09 | End: 2019-05-09

## 2019-05-09 RX ORDER — LOSARTAN POTASSIUM 100 MG/1
50 TABLET, FILM COATED ORAL DAILY
Refills: 0 | Status: DISCONTINUED | OUTPATIENT
Start: 2019-05-09 | End: 2019-05-09

## 2019-05-09 RX ORDER — HEPARIN SODIUM 5000 [USP'U]/ML
5000 INJECTION INTRAVENOUS; SUBCUTANEOUS EVERY 8 HOURS
Refills: 0 | Status: DISCONTINUED | OUTPATIENT
Start: 2019-05-09 | End: 2019-05-11

## 2019-05-09 RX ORDER — DEXTROSE MONOHYDRATE, SODIUM CHLORIDE, AND POTASSIUM CHLORIDE 50; .745; 4.5 G/1000ML; G/1000ML; G/1000ML
1000 INJECTION, SOLUTION INTRAVENOUS
Refills: 0 | Status: DISCONTINUED | OUTPATIENT
Start: 2019-05-09 | End: 2019-05-09

## 2019-05-09 RX ADMIN — POTASSIUM PHOSPHATE, MONOBASIC POTASSIUM PHOSPHATE, DIBASIC 62.5 MILLIMOLE(S): 236; 224 INJECTION, SOLUTION INTRAVENOUS at 10:42

## 2019-05-09 RX ADMIN — MORPHINE SULFATE 4 MILLIGRAM(S): 50 CAPSULE, EXTENDED RELEASE ORAL at 03:30

## 2019-05-09 RX ADMIN — MORPHINE SULFATE 4 MILLIGRAM(S): 50 CAPSULE, EXTENDED RELEASE ORAL at 18:26

## 2019-05-09 RX ADMIN — PIPERACILLIN AND TAZOBACTAM 25 GRAM(S): 4; .5 INJECTION, POWDER, LYOPHILIZED, FOR SOLUTION INTRAVENOUS at 11:28

## 2019-05-09 RX ADMIN — HYDROMORPHONE HYDROCHLORIDE 1 MILLIGRAM(S): 2 INJECTION INTRAMUSCULAR; INTRAVENOUS; SUBCUTANEOUS at 19:00

## 2019-05-09 RX ADMIN — MORPHINE SULFATE 2 MILLIGRAM(S): 50 CAPSULE, EXTENDED RELEASE ORAL at 20:13

## 2019-05-09 RX ADMIN — HYDROMORPHONE HYDROCHLORIDE 1 MILLIGRAM(S): 2 INJECTION INTRAMUSCULAR; INTRAVENOUS; SUBCUTANEOUS at 18:40

## 2019-05-09 RX ADMIN — Medication 100 MILLIEQUIVALENT(S): at 04:51

## 2019-05-09 RX ADMIN — HEPARIN SODIUM 5000 UNIT(S): 5000 INJECTION INTRAVENOUS; SUBCUTANEOUS at 06:19

## 2019-05-09 RX ADMIN — Medication 100 MILLIEQUIVALENT(S): at 08:07

## 2019-05-09 RX ADMIN — SODIUM CHLORIDE 120 MILLILITER(S): 9 INJECTION, SOLUTION INTRAVENOUS at 20:50

## 2019-05-09 RX ADMIN — PANTOPRAZOLE SODIUM 40 MILLIGRAM(S): 20 TABLET, DELAYED RELEASE ORAL at 06:19

## 2019-05-09 RX ADMIN — SODIUM CHLORIDE 1000 MILLILITER(S): 9 INJECTION INTRAMUSCULAR; INTRAVENOUS; SUBCUTANEOUS at 04:50

## 2019-05-09 RX ADMIN — Medication 400 MILLIGRAM(S): at 23:33

## 2019-05-09 RX ADMIN — LOSARTAN POTASSIUM 50 MILLIGRAM(S): 100 TABLET, FILM COATED ORAL at 06:19

## 2019-05-09 RX ADMIN — HEPARIN SODIUM 5000 UNIT(S): 5000 INJECTION INTRAVENOUS; SUBCUTANEOUS at 21:51

## 2019-05-09 RX ADMIN — HYDROMORPHONE HYDROCHLORIDE 1 MILLIGRAM(S): 2 INJECTION INTRAMUSCULAR; INTRAVENOUS; SUBCUTANEOUS at 18:55

## 2019-05-09 RX ADMIN — MORPHINE SULFATE 2 MILLIGRAM(S): 50 CAPSULE, EXTENDED RELEASE ORAL at 08:20

## 2019-05-09 RX ADMIN — MORPHINE SULFATE 2 MILLIGRAM(S): 50 CAPSULE, EXTENDED RELEASE ORAL at 12:23

## 2019-05-09 RX ADMIN — SODIUM CHLORIDE 2000 MILLILITER(S): 9 INJECTION INTRAMUSCULAR; INTRAVENOUS; SUBCUTANEOUS at 03:29

## 2019-05-09 RX ADMIN — PIPERACILLIN AND TAZOBACTAM 25 GRAM(S): 4; .5 INJECTION, POWDER, LYOPHILIZED, FOR SOLUTION INTRAVENOUS at 21:53

## 2019-05-09 RX ADMIN — MORPHINE SULFATE 4 MILLIGRAM(S): 50 CAPSULE, EXTENDED RELEASE ORAL at 18:36

## 2019-05-09 RX ADMIN — DEXTROSE MONOHYDRATE, SODIUM CHLORIDE, AND POTASSIUM CHLORIDE 125 MILLILITER(S): 50; .745; 4.5 INJECTION, SOLUTION INTRAVENOUS at 06:22

## 2019-05-09 RX ADMIN — PIPERACILLIN AND TAZOBACTAM 200 GRAM(S): 4; .5 INJECTION, POWDER, LYOPHILIZED, FOR SOLUTION INTRAVENOUS at 03:29

## 2019-05-09 RX ADMIN — MORPHINE SULFATE 2 MILLIGRAM(S): 50 CAPSULE, EXTENDED RELEASE ORAL at 19:58

## 2019-05-09 RX ADMIN — MORPHINE SULFATE 2 MILLIGRAM(S): 50 CAPSULE, EXTENDED RELEASE ORAL at 12:38

## 2019-05-09 RX ADMIN — MORPHINE SULFATE 2 MILLIGRAM(S): 50 CAPSULE, EXTENDED RELEASE ORAL at 08:35

## 2019-05-09 NOTE — H&P ADULT - PROBLEM SELECTOR PLAN 1
-Admit patient  -OR planning for laparoscopic cholecystectomy, possible open   -IV antibiotics  -Pre-op labs: cbc, cmp, coags, type and screen  -NPO, IVF  -Pain management prn  -Zofran prn  -Dvt ppx  -Risks and benefits explained to patient and patient understood.  -Will d/w attending

## 2019-05-09 NOTE — H&P ADULT - NSICDXPASTMEDICALHX_GEN_ALL_CORE_FT
PAST MEDICAL HISTORY:  Acute gastric ulcer     GERD (gastroesophageal reflux disease)     HTN (hypertension)

## 2019-05-09 NOTE — H&P ADULT - NSHPSOCIALHISTORY_GEN_ALL_CORE
Smoking: denies cigarettes  Alcohol: drinks socially once a month  Marijuana: occasional marijuana use Smoking: denies cigarettes  Alcohol: drinks socially once a month  Drugs: occasional marijuana use

## 2019-05-09 NOTE — PROGRESS NOTE ADULT - SUBJECTIVE AND OBJECTIVE BOX
Team Surgery Preop Note    Patient is a 33y old  Male who presents with a chief complaint of Upper abdominal pain, vomiting (09 May 2019 03:55)    Diagnosis: Acute Cholecystitis   Procedure: Laparoscopic Cholecystectomy   Surgeon: Dr. Olson                           13.5   9.20  )-----------( 268      ( 09 May 2019 07:13 )             41.3     05-08    137  |  105  |  8   ----------------------------<  113<H>  3.4<L>   |  23  |  0.94    Ca    10.1      08 May 2019 16:44    TPro  8.9<H>  /  Alb  4.4  /  TBili  1.4<H>  /  DBili  x   /  AST  16  /  ALT  33  /  AlkPhos  91  05-08    PT/INR - ( 08 May 2019 16:44 )   PT: 13.3 sec;   INR: 1.18 ratio         PTT - ( 08 May 2019 16:44 )  PTT:30.8 sec    A/P: 33F admitted with acute cholecystitis, added on to the OR schedule for Lap damian today.    [ X] Type & Screen  [ X] CBC  [X ] BMP  [X] PT/PTT/INR  [X] Urinalysis  [X ] Chest X-ray  [X] EKG  [X ] NPO/IVF  Consent to be obtained by surgeon  [ X] Added on to OR Schedule

## 2019-05-09 NOTE — H&P ADULT - HISTORY OF PRESENT ILLNESS
32 y/o male PMHx HTN, gastric ulcer c/o upper abdominal pain x 4 days and vomiting x 2 days. Pain was severe and began after eating fried food. Pain radiated to the R back. Pt reports bilious vomiting that became clear liquid. Has not been able to eat the past few days. Admits to 2 episodes of diarrhea yesterday and flatus today. +chills. States that he's had similar episodes of upper abdominal pain and vomiting 3 weeks ago and 1 months ago which resolved on its own. Patient denies fever, constipation, hematuria, melena, hematochezia, chest pain, shortness of breath, dizziness. 32 y/o male PMHx HTN, gastric ulcer c/o upper abdominal pain x 4 days and vomiting x 2 days. Pain was severe and began after eating fried food. Pain radiated to the R back. Pt reports bilious vomiting that became clear liquid. Has not been able to eat the past few days. Admits to 2 episodes of diarrhea yesterday and flatus today. +chills. States that he's had similar episodes of upper abdominal pain and vomiting 3 weeks ago and 1 month ago which resolved on its own. Patient denies fever, constipation, hematuria, melena, hematochezia, chest pain, shortness of breath, dizziness.

## 2019-05-09 NOTE — H&P ADULT - NSHPPHYSICALEXAM_GEN_ALL_CORE
PHYSICAL EXAM:  GENERAL: NAD, well-developed  HEAD:  Atraumatic, Normocephalic  EYES: Conjunctiva and sclera clear  ENMT: No tonsillar erythema, exudates, or enlargement; Moist mucous membranes, No lesions  NECK: Supple, No JVD, Normal thyroid  NERVOUS SYSTEM:  Alert & Oriented X3  CHEST/LUNG: Clear to auscultation bilaterally; No rales, rhonchi, wheezing  HEART: Regular rate and rhythm. S1S2  ABDOMEN: Nondistended, +BS, soft, epigastric and RUQ tenderness, no guarding, no rigidity   EXTREMITIES:  2+ Peripheral Pulses, No clubbing, cyanosis, or edema

## 2019-05-09 NOTE — H&P ADULT - ASSESSMENT
32 y/o male PMHx HTN, gastric ulcer c/o upper abdominal pain x 4 days and vomiting x 2 days. Admitted with cholecystitis.

## 2019-05-09 NOTE — PROGRESS NOTE ADULT - SUBJECTIVE AND OBJECTIVE BOX
Post-op check    S/P laparoscopic cholecystectomy POD#0  33 year old Male seen and examined at bedside with wife present. Admits to incisional pain well controlled with medication. Tolerating clear liquids. Using incentive spirometer. No ambulation yet. Denies chest pain, shortness of breath, nausea/ vomiting, and dizziness.     Vital Signs Last 24 Hrs  T(F): 98.7 (05-09-19 @ 21:30), Max: 99.4 (05-09-19 @ 14:43)  HR: 58 (05-09-19 @ 21:30)  BP: 154/96 (05-09-19 @ 21:30)  RR: 16 (05-09-19 @ 21:30)  SpO2: 98% (05-09-19 @ 21:30)    GENERAL: Alert, NAD  CHEST/LUNG: Clear to auscultation bilaterally, respirations nonlabored  HEART: S1S2, Regular rate and rhythm  ABDOMEN: Dressings C/D/I; JOSEPH with serosanguinous output. + Bowel sounds, soft, Appropriate incisional tenderness, Nondistended  EXTREMITIES:  no calf tenderness, No edema, intermittent compression devices in place bilaterally    Assessment: 33M S/P laparoscopic cholecystectomy POD#0    Plan:  - local wound care  - DVT prophylaxis, Incentive Spirometer, OOB, Ambulating, pain control  - Continue antibiotics x 2 doses  - f/u labs   - will discuss with surgical attending

## 2019-05-09 NOTE — H&P ADULT - NSHPLABSRESULTS_GEN_ALL_CORE
15.5   10.83 )-----------( 337      ( 08 May 2019 16:44 )             47.1   05-08    137  |  105  |  8   ----------------------------<  113<H>  3.4<L>   |  23  |  0.94    Ca    10.1      08 May 2019 16:44    TPro  8.9<H>  /  Alb  4.4  /  TBili  1.4<H>  /  DBili  x   /  AST  16  /  ALT  33  /  AlkPhos  91  05-08      US Abdomen Complete (05.09.19 @ 01:43)   IMPRESSION:   Cholelithiasis with gallbladder wall thickening and trace pericholecystic   fluid. Correlate for the presence of a Abbott sign.  Hepatic steatosis..    CT Angio Abdomen and Pelvis w/ IV Cont (05.08.19 @ 19:39)   IMPRESSION:   No aortic dissection.  Nonspecific distention of the gallbladder with suggestion of focal   inflammation at the gallbladder neck. Further evaluation with right upper   quadrant ultrasound is recommended.    Xray Chest 1 View-PORTABLE IMMEDIATE (05.08.19 @ 16:57)   IMPRESSION: Negative chest.

## 2019-05-10 LAB
ALBUMIN SERPL ELPH-MCNC: 3.4 G/DL — SIGNIFICANT CHANGE UP (ref 3.3–5)
ALP SERPL-CCNC: 87 U/L — SIGNIFICANT CHANGE UP (ref 40–120)
ALT FLD-CCNC: 131 U/L — HIGH (ref 12–78)
ANION GAP SERPL CALC-SCNC: 9 MMOL/L — SIGNIFICANT CHANGE UP (ref 5–17)
AST SERPL-CCNC: 93 U/L — HIGH (ref 15–37)
BASOPHILS # BLD AUTO: 0.03 K/UL — SIGNIFICANT CHANGE UP (ref 0–0.2)
BASOPHILS NFR BLD AUTO: 0.4 % — SIGNIFICANT CHANGE UP (ref 0–2)
BILIRUB SERPL-MCNC: 1.6 MG/DL — HIGH (ref 0.2–1.2)
BUN SERPL-MCNC: 5 MG/DL — LOW (ref 7–23)
CALCIUM SERPL-MCNC: 8.9 MG/DL — SIGNIFICANT CHANGE UP (ref 8.5–10.1)
CHLORIDE SERPL-SCNC: 107 MMOL/L — SIGNIFICANT CHANGE UP (ref 96–108)
CO2 SERPL-SCNC: 25 MMOL/L — SIGNIFICANT CHANGE UP (ref 22–31)
CREAT SERPL-MCNC: 0.93 MG/DL — SIGNIFICANT CHANGE UP (ref 0.5–1.3)
EOSINOPHIL # BLD AUTO: 0.06 K/UL — SIGNIFICANT CHANGE UP (ref 0–0.5)
EOSINOPHIL NFR BLD AUTO: 0.9 % — SIGNIFICANT CHANGE UP (ref 0–6)
GLUCOSE SERPL-MCNC: 106 MG/DL — HIGH (ref 70–99)
GRAM STN FLD: SIGNIFICANT CHANGE UP
HCT VFR BLD CALC: 42.1 % — SIGNIFICANT CHANGE UP (ref 39–50)
HGB BLD-MCNC: 13.7 G/DL — SIGNIFICANT CHANGE UP (ref 13–17)
IMM GRANULOCYTES NFR BLD AUTO: 0.3 % — SIGNIFICANT CHANGE UP (ref 0–1.5)
LYMPHOCYTES # BLD AUTO: 1.14 K/UL — SIGNIFICANT CHANGE UP (ref 1–3.3)
LYMPHOCYTES # BLD AUTO: 16.9 % — SIGNIFICANT CHANGE UP (ref 13–44)
MAGNESIUM SERPL-MCNC: 2.2 MG/DL — SIGNIFICANT CHANGE UP (ref 1.6–2.6)
MCHC RBC-ENTMCNC: 25.9 PG — LOW (ref 27–34)
MCHC RBC-ENTMCNC: 32.5 GM/DL — SIGNIFICANT CHANGE UP (ref 32–36)
MCV RBC AUTO: 79.7 FL — LOW (ref 80–100)
MONOCYTES # BLD AUTO: 0.95 K/UL — HIGH (ref 0–0.9)
MONOCYTES NFR BLD AUTO: 14.1 % — HIGH (ref 2–14)
NEUTROPHILS # BLD AUTO: 4.53 K/UL — SIGNIFICANT CHANGE UP (ref 1.8–7.4)
NEUTROPHILS NFR BLD AUTO: 67.4 % — SIGNIFICANT CHANGE UP (ref 43–77)
NRBC # BLD: 0 /100 WBCS — SIGNIFICANT CHANGE UP (ref 0–0)
PHOSPHATE SERPL-MCNC: 3.5 MG/DL — SIGNIFICANT CHANGE UP (ref 2.5–4.5)
PLATELET # BLD AUTO: 261 K/UL — SIGNIFICANT CHANGE UP (ref 150–400)
POTASSIUM SERPL-MCNC: 3.6 MMOL/L — SIGNIFICANT CHANGE UP (ref 3.5–5.3)
POTASSIUM SERPL-SCNC: 3.6 MMOL/L — SIGNIFICANT CHANGE UP (ref 3.5–5.3)
PROT SERPL-MCNC: 7.1 GM/DL — SIGNIFICANT CHANGE UP (ref 6–8.3)
RBC # BLD: 5.28 M/UL — SIGNIFICANT CHANGE UP (ref 4.2–5.8)
RBC # FLD: 13.9 % — SIGNIFICANT CHANGE UP (ref 10.3–14.5)
SODIUM SERPL-SCNC: 141 MMOL/L — SIGNIFICANT CHANGE UP (ref 135–145)
SPECIMEN SOURCE: SIGNIFICANT CHANGE UP
WBC # BLD: 6.73 K/UL — SIGNIFICANT CHANGE UP (ref 3.8–10.5)
WBC # FLD AUTO: 6.73 K/UL — SIGNIFICANT CHANGE UP (ref 3.8–10.5)

## 2019-05-10 RX ORDER — MORPHINE SULFATE 50 MG/1
2 CAPSULE, EXTENDED RELEASE ORAL ONCE
Refills: 0 | Status: DISCONTINUED | OUTPATIENT
Start: 2019-05-10 | End: 2019-05-10

## 2019-05-10 RX ADMIN — HEPARIN SODIUM 5000 UNIT(S): 5000 INJECTION INTRAVENOUS; SUBCUTANEOUS at 05:47

## 2019-05-10 RX ADMIN — SODIUM CHLORIDE 120 MILLILITER(S): 9 INJECTION, SOLUTION INTRAVENOUS at 23:47

## 2019-05-10 RX ADMIN — OXYCODONE AND ACETAMINOPHEN 1 TABLET(S): 5; 325 TABLET ORAL at 18:56

## 2019-05-10 RX ADMIN — MORPHINE SULFATE 2 MILLIGRAM(S): 50 CAPSULE, EXTENDED RELEASE ORAL at 06:02

## 2019-05-10 RX ADMIN — Medication 650 MILLIGRAM(S): at 14:06

## 2019-05-10 RX ADMIN — MORPHINE SULFATE 2 MILLIGRAM(S): 50 CAPSULE, EXTENDED RELEASE ORAL at 02:12

## 2019-05-10 RX ADMIN — Medication 650 MILLIGRAM(S): at 14:30

## 2019-05-10 RX ADMIN — LOSARTAN POTASSIUM 50 MILLIGRAM(S): 100 TABLET, FILM COATED ORAL at 05:46

## 2019-05-10 RX ADMIN — SODIUM CHLORIDE 120 MILLILITER(S): 9 INJECTION, SOLUTION INTRAVENOUS at 05:47

## 2019-05-10 RX ADMIN — PIPERACILLIN AND TAZOBACTAM 25 GRAM(S): 4; .5 INJECTION, POWDER, LYOPHILIZED, FOR SOLUTION INTRAVENOUS at 05:46

## 2019-05-10 RX ADMIN — MORPHINE SULFATE 2 MILLIGRAM(S): 50 CAPSULE, EXTENDED RELEASE ORAL at 10:18

## 2019-05-10 RX ADMIN — MORPHINE SULFATE 2 MILLIGRAM(S): 50 CAPSULE, EXTENDED RELEASE ORAL at 23:43

## 2019-05-10 RX ADMIN — MORPHINE SULFATE 2 MILLIGRAM(S): 50 CAPSULE, EXTENDED RELEASE ORAL at 05:47

## 2019-05-10 RX ADMIN — OXYCODONE AND ACETAMINOPHEN 1 TABLET(S): 5; 325 TABLET ORAL at 13:30

## 2019-05-10 RX ADMIN — HEPARIN SODIUM 5000 UNIT(S): 5000 INJECTION INTRAVENOUS; SUBCUTANEOUS at 18:57

## 2019-05-10 RX ADMIN — OXYCODONE AND ACETAMINOPHEN 1 TABLET(S): 5; 325 TABLET ORAL at 19:30

## 2019-05-10 RX ADMIN — MORPHINE SULFATE 2 MILLIGRAM(S): 50 CAPSULE, EXTENDED RELEASE ORAL at 02:27

## 2019-05-10 RX ADMIN — Medication 650 MILLIGRAM(S): at 23:44

## 2019-05-10 RX ADMIN — OXYCODONE AND ACETAMINOPHEN 1 TABLET(S): 5; 325 TABLET ORAL at 12:55

## 2019-05-10 RX ADMIN — MORPHINE SULFATE 2 MILLIGRAM(S): 50 CAPSULE, EXTENDED RELEASE ORAL at 10:48

## 2019-05-10 RX ADMIN — Medication 1000 MILLIGRAM(S): at 00:00

## 2019-05-10 NOTE — DISCHARGE NOTE PROVIDER - CARE PROVIDER_API CALL
Leidy Olson)  Surgery  [address], [address]  [place] [address]  Phone: [phone]  Fax: [phone]  Follow Up Time:

## 2019-05-10 NOTE — CHART NOTE - NSCHARTNOTEFT_GEN_A_CORE
Pt seen with Dr. Olson as per RN pt has lowgrade temps, also pt will not be able to follow-up for JOSEPH removal this coming Tuesday. Will observe pt overnight, encourage IS, OOB, monitor JOSEPH output and remove drain in Am prior to D/C.

## 2019-05-10 NOTE — DISCHARGE NOTE PROVIDER - NSDCCPCAREPLAN_GEN_ALL_CORE_FT
PRINCIPAL DISCHARGE DIAGNOSIS  Diagnosis: Cholecystitis  Assessment and Plan of Treatment: Cholecystitis PRINCIPAL DISCHARGE DIAGNOSIS  Diagnosis: Cholecystitis  Assessment and Plan of Treatment: Acute cholecystitis

## 2019-05-10 NOTE — PROGRESS NOTE ADULT - SUBJECTIVE AND OBJECTIVE BOX
SURGERY PROGRESS HPI:  Pt seen and examined at bedside. Post-op pain is well controlled on pain medication. Pt denies complaints. No acute events overnight. Pt tolerating clear liquid diet. Pt denies nausea and vomiting. Voiding well. Pt denies chest pain, SOB, dizziness, fever, chills. Ambulating. Using incentive spirometer.     Vital Signs Last 24 Hrs  T(C): 37.3 (10 May 2019 02:48), Max: 37.4 (09 May 2019 14:43)  T(F): 99.2 (10 May 2019 02:48), Max: 99.4 (09 May 2019 14:43)  HR: 67 (10 May 2019 02:48) (58 - 89)  BP: 132/80 (10 May 2019 02:48) (132/80 - 173/105)  BP(mean): --  RR: 16 (10 May 2019 02:48) (13 - 20)  SpO2: 100% (10 May 2019 02:48) (95% - 100%)      PHYSICAL EXAM:  GENERAL: Alert, NAD  CHEST/LUNG: Clear to auscultation bilaterally, respirations nonlabored  HEART: S1S2, Regular rate and rhythm  ABDOMEN: Dressings C/D/I; JOSEPH with serosanguinous output. + Bowel sounds, soft, Appropriate incisional tenderness, Nondistended  EXTREMITIES:  no calf tenderness, No edema, intermittent compression devices in place bilaterally    I&O's Detail    09 May 2019 07:01  -  10 May 2019 05:24  --------------------------------------------------------  IN:  Total IN: 0 mL    OUT:    Bulb: 50 mL  Total OUT: 50 mL    Total NET: -50 mL          LABS:                        13.5   9.20  )-----------( 268      ( 09 May 2019 07:13 )             41.3     05-09    142  |  111<H>  |  8   ----------------------------<  105<H>  3.6   |  22  |  1.02    Ca    8.2<L>      09 May 2019 07:13  Phos  2.4     05-09  Mg     2.1     05-    TPro  6.7  /  Alb  3.4  /  TBili  1.7<H>  /  DBili  .32<H>  /  AST  51<H>  /  ALT  48  /  AlkPhos  79      PT/INR - ( 08 May 2019 16:44 )   PT: 13.3 sec;   INR: 1.18 ratio         PTT - ( 08 May 2019 16:44 )  PTT:30.8 sec  Urinalysis Basic - ( 09 May 2019 10:03 )    Color: Yellow / Appearance: Clear / S.010 / pH: x  Gluc: x / Ketone: Negative  / Bili: Negative / Urobili: Negative mg/dL   Blood: x / Protein: Negative mg/dL / Nitrite: Negative   Leuk Esterase: Negative / RBC: x / WBC x   Sq Epi: x / Non Sq Epi: x / Bacteria: x      Assessment: 33M S/P laparoscopic cholecystectomy POD#1    Plan:  - local wound care  - DVT prophylaxis, Incentive Spirometer, OOB, Ambulating, pain control  - Continue antibiotics x 2 doses  - f/u labs   - will discuss with surgical attending

## 2019-05-10 NOTE — DISCHARGE NOTE PROVIDER - NSDCCPGOAL_GEN_ALL_CORE_FT
Follow up in 7-10 days. Please call the office to make an appointment. Activity as tolerated. Rest as needed. You may eat a low fat diet. Do not lift anything heavier than 10 pounds. You may take motrin or advil for mild pain as needed, in addition to prescribed narcotic medication. Do not drive while taking narcotic pain medication. You may take over the counter stool softeners as needed. Call for any fever over 101, nausea, vomiting, severe pain, no passing of gas or bowel movement. You may shower and pat dry abdomen. Leave the white steri strips in place; they will fall off in 5-7 days.

## 2019-05-11 ENCOUNTER — TRANSCRIPTION ENCOUNTER (OUTPATIENT)
Age: 34
End: 2019-05-11

## 2019-05-11 VITALS
HEART RATE: 85 BPM | SYSTOLIC BLOOD PRESSURE: 163 MMHG | TEMPERATURE: 100 F | RESPIRATION RATE: 18 BRPM | DIASTOLIC BLOOD PRESSURE: 101 MMHG | OXYGEN SATURATION: 99 %

## 2019-05-11 LAB
ANION GAP SERPL CALC-SCNC: 8 MMOL/L — SIGNIFICANT CHANGE UP (ref 5–17)
BUN SERPL-MCNC: 6 MG/DL — LOW (ref 7–23)
CALCIUM SERPL-MCNC: 8.9 MG/DL — SIGNIFICANT CHANGE UP (ref 8.5–10.1)
CHLORIDE SERPL-SCNC: 106 MMOL/L — SIGNIFICANT CHANGE UP (ref 96–108)
CO2 SERPL-SCNC: 25 MMOL/L — SIGNIFICANT CHANGE UP (ref 22–31)
CREAT SERPL-MCNC: 0.74 MG/DL — SIGNIFICANT CHANGE UP (ref 0.5–1.3)
GLUCOSE SERPL-MCNC: 103 MG/DL — HIGH (ref 70–99)
HCT VFR BLD CALC: 40.9 % — SIGNIFICANT CHANGE UP (ref 39–50)
HGB BLD-MCNC: 13.4 G/DL — SIGNIFICANT CHANGE UP (ref 13–17)
MAGNESIUM SERPL-MCNC: 2.1 MG/DL — SIGNIFICANT CHANGE UP (ref 1.6–2.6)
MCHC RBC-ENTMCNC: 26 PG — LOW (ref 27–34)
MCHC RBC-ENTMCNC: 32.8 GM/DL — SIGNIFICANT CHANGE UP (ref 32–36)
MCV RBC AUTO: 79.3 FL — LOW (ref 80–100)
NRBC # BLD: 0 /100 WBCS — SIGNIFICANT CHANGE UP (ref 0–0)
PHOSPHATE SERPL-MCNC: 3.2 MG/DL — SIGNIFICANT CHANGE UP (ref 2.5–4.5)
PLATELET # BLD AUTO: 245 K/UL — SIGNIFICANT CHANGE UP (ref 150–400)
POTASSIUM SERPL-MCNC: 3.4 MMOL/L — LOW (ref 3.5–5.3)
POTASSIUM SERPL-SCNC: 3.4 MMOL/L — LOW (ref 3.5–5.3)
RBC # BLD: 5.16 M/UL — SIGNIFICANT CHANGE UP (ref 4.2–5.8)
RBC # FLD: 13.5 % — SIGNIFICANT CHANGE UP (ref 10.3–14.5)
SODIUM SERPL-SCNC: 139 MMOL/L — SIGNIFICANT CHANGE UP (ref 135–145)
WBC # BLD: 7.67 K/UL — SIGNIFICANT CHANGE UP (ref 3.8–10.5)
WBC # FLD AUTO: 7.67 K/UL — SIGNIFICANT CHANGE UP (ref 3.8–10.5)

## 2019-05-11 RX ORDER — POTASSIUM CHLORIDE 20 MEQ
20 PACKET (EA) ORAL ONCE
Refills: 0 | Status: COMPLETED | OUTPATIENT
Start: 2019-05-11 | End: 2019-05-11

## 2019-05-11 RX ADMIN — HEPARIN SODIUM 5000 UNIT(S): 5000 INJECTION INTRAVENOUS; SUBCUTANEOUS at 05:50

## 2019-05-11 RX ADMIN — OXYCODONE AND ACETAMINOPHEN 1 TABLET(S): 5; 325 TABLET ORAL at 14:16

## 2019-05-11 RX ADMIN — MORPHINE SULFATE 2 MILLIGRAM(S): 50 CAPSULE, EXTENDED RELEASE ORAL at 05:49

## 2019-05-11 RX ADMIN — SODIUM CHLORIDE 120 MILLILITER(S): 9 INJECTION, SOLUTION INTRAVENOUS at 08:28

## 2019-05-11 RX ADMIN — MORPHINE SULFATE 2 MILLIGRAM(S): 50 CAPSULE, EXTENDED RELEASE ORAL at 00:00

## 2019-05-11 RX ADMIN — MORPHINE SULFATE 2 MILLIGRAM(S): 50 CAPSULE, EXTENDED RELEASE ORAL at 06:00

## 2019-05-11 RX ADMIN — Medication 20 MILLIEQUIVALENT(S): at 09:16

## 2019-05-11 RX ADMIN — LOSARTAN POTASSIUM 50 MILLIGRAM(S): 100 TABLET, FILM COATED ORAL at 05:49

## 2019-05-11 RX ADMIN — OXYCODONE AND ACETAMINOPHEN 1 TABLET(S): 5; 325 TABLET ORAL at 13:38

## 2019-05-11 NOTE — CHART NOTE - NSCHARTNOTEFT_GEN_A_CORE
Per my discussion with Dr Olson, JOSEPH drain removed. No bleeding or drainage. Dry gauze/tegaderm applied.  Pt tolerated well. Comfortable with d/c and warning precautions/ Follow up instructions.

## 2019-05-11 NOTE — DISCHARGE NOTE NURSING/CASE MANAGEMENT/SOCIAL WORK - NSDCDPATPORTLINK_GEN_ALL_CORE
You can access the [facility] Patient Portal, offered by [facility], by registering with the following website: [email or web address]

## 2019-05-11 NOTE — PROGRESS NOTE ADULT - SUBJECTIVE AND OBJECTIVE BOX
Postoperative Day #: 2  Patient seen and examined bedside resting comfortably.  No complaints offered. +Flatus, no BM  Abdominal pain is well controlled on present medications.  Denies nausea and vomiting. Tolerating diet.  Admits to productive cough.    T(F): 100 (05-11-19 @ 05:39), Max: 100.9 (05-10-19 @ 13:46)  HR: 89 (05-11-19 @ 05:39) (71 - 89)  BP: 159/100 (05-10-19 @ 23:33) (140/88 - 159/100)  RR: 18 (05-11-19 @ 05:39) (18 - 18)  SpO2: 100% (05-11-19 @ 05:39) (97% - 100%)  Wt(kg): --  CAPILLARY BLOOD GLUCOSE    PHYSICAL EXAM:  General: NAD, WDWN  Neuro:  Alert & oriented x 3  HEENT: NCAT, EOMI, conjunctiva clear  CV: +S1+S2 regular rate and rhythm  Lung: clear to auscultation bilaterally, respirations nonlabored, good inspiratory effort  Abdomen: soft, appropriate periincisional tenderness. R side JOSEPH drain with s/s output, 135cc. Steri strips CDI x 2, umbilical dsg CDI.  Extremities: no pedal edema or calf tenderness noted     LABS:                        13.4   7.67  )-----------( 245      ( 11 May 2019 07:41 )             40.9     05-11    139  |  106  |  6<L>  ----------------------------<  103<H>  3.4<L>   |  25  |  0.74    Ca    8.9      11 May 2019 07:41  Phos  3.2     05-11  Mg     2.1     05-11    TPro  7.1  /  Alb  3.4  /  TBili  1.6<H>  /  DBili  x   /  AST  93<H>  /  ALT  131<H>  /  AlkPhos  87  05-10      Culture Results:   No growth (05-10 @ 01:53)    A/P: 32yo male POD#2 s/p lap damian. Low grade temps likely d/t atelectasis  Hypokalemia- repleted PO  Continue diet as tolerated, heparin VTE ppx  Continue postoperative care, patient encouraged to ambulate and use Incent spirometry  D/c planning, will discuss with Dr Olson

## 2019-05-13 LAB — SURGICAL PATHOLOGY STUDY: SIGNIFICANT CHANGE UP

## 2019-05-14 LAB
CULTURE RESULTS: SIGNIFICANT CHANGE UP
GRAM STN FLD: SIGNIFICANT CHANGE UP
SPECIMEN SOURCE: SIGNIFICANT CHANGE UP

## 2019-05-17 DIAGNOSIS — K21.9 GASTRO-ESOPHAGEAL REFLUX DISEASE WITHOUT ESOPHAGITIS: ICD-10-CM

## 2019-05-17 DIAGNOSIS — K25.7 CHRONIC GASTRIC ULCER WITHOUT HEMORRHAGE OR PERFORATION: ICD-10-CM

## 2019-05-17 DIAGNOSIS — Z83.79 FAMILY HISTORY OF OTHER DISEASES OF THE DIGESTIVE SYSTEM: ICD-10-CM

## 2019-05-17 DIAGNOSIS — Z83.3 FAMILY HISTORY OF DIABETES MELLITUS: ICD-10-CM

## 2019-05-17 DIAGNOSIS — R10.9 UNSPECIFIED ABDOMINAL PAIN: ICD-10-CM

## 2019-05-17 DIAGNOSIS — I10 ESSENTIAL (PRIMARY) HYPERTENSION: ICD-10-CM

## 2019-05-17 DIAGNOSIS — K80.00 CALCULUS OF GALLBLADDER WITH ACUTE CHOLECYSTITIS WITHOUT OBSTRUCTION: ICD-10-CM

## 2019-12-16 NOTE — H&P PST ADULT - NSSTREETDRUGQU_GEN_ALL_CORE_SD
thinking about quitting Finasteride Male Counseling: Finasteride Counseling:  I discussed with the patient the risks of use of finasteride including but not limited to decreased libido, decreased ejaculate volume, gynecomastia, and depression. Women should not handle medication.  All of the patient's questions and concerns were addressed. Finasteride Counseling:  I discussed with the patient the risks of use of finasteride including but not limited to decreased libido, decreased ejaculate volume, gynecomastia, and depression. Women should not handle medication.  All of the patient's questions and concerns were addressed.

## 2020-06-17 ENCOUNTER — RESULT REVIEW (OUTPATIENT)
Age: 35
End: 2020-06-17

## 2021-07-01 NOTE — H&P PST ADULT - VASCULAR
LM for pt, pt requested to go on wait list for sooner Cysto appt with Dr Carter Brothers, called pt to come in tomorrow 7/2/21 at 1 pm in [address] location  Scheduled pt tentatively, waiting for confirmation  Equal and normal pulses (carotid, femoral, dorsalis pedis)

## 2021-09-08 NOTE — DISCHARGE NOTE PROVIDER - HOSPITAL COURSE
34 y/o male admitted with cholecystitis s/p laparoscopic cholecystectomy. Operation went well. Patient tolerated advancement of diet.     At the time of discharge, the patient was hemodynamically stable, was tolerating PO diet, was voiding urine, was ambulating, and was comfortable with adequate pain control. No nausea, vomiting, fever, chills. Patient instructed to follow up and to call the office to make an appointment. Patient instructed to call for any fever over 101, nausea, vomiting, severe pain, no passing of gas or bowel movement. Yes 34 y/o male admitted with cholecystitis s/p laparoscopic cholecystectomy. Operation went well. Patient tolerated advancement of diet. JOSEPH drain was removed and patient was stable for d/c with OP Follow up.    At the time of discharge, the patient was hemodynamically stable, was tolerating PO diet, was voiding urine, was ambulating, and was comfortable with adequate pain control. No nausea, vomiting, fever, chills. Patient instructed to follow up and to call the office to make an appointment. Patient instructed to call for any fever over 101, nausea, vomiting, severe pain, no passing of gas or bowel movement.

## 2022-01-27 ENCOUNTER — RESULT REVIEW (OUTPATIENT)
Age: 37
End: 2022-01-27

## 2022-04-04 ENCOUNTER — LABORATORY RESULT (OUTPATIENT)
Age: 37
End: 2022-04-04

## 2022-04-04 ENCOUNTER — NON-APPOINTMENT (OUTPATIENT)
Age: 37
End: 2022-04-04

## 2022-04-04 ENCOUNTER — APPOINTMENT (OUTPATIENT)
Dept: CARDIOLOGY | Facility: CLINIC | Age: 37
End: 2022-04-04
Payer: COMMERCIAL

## 2022-04-04 VITALS
SYSTOLIC BLOOD PRESSURE: 160 MMHG | DIASTOLIC BLOOD PRESSURE: 100 MMHG | BODY MASS INDEX: 31.7 KG/M2 | TEMPERATURE: 98.3 F | WEIGHT: 247 LBS | HEART RATE: 70 BPM | RESPIRATION RATE: 16 BRPM | HEIGHT: 74 IN | OXYGEN SATURATION: 97 %

## 2022-04-04 PROCEDURE — 93000 ELECTROCARDIOGRAM COMPLETE: CPT

## 2022-04-04 PROCEDURE — 99203 OFFICE O/P NEW LOW 30 MIN: CPT

## 2022-04-04 RX ORDER — LOSARTAN POTASSIUM 50 MG/1
50 TABLET, FILM COATED ORAL
Refills: 0 | Status: COMPLETED | COMMUNITY
End: 2022-04-04

## 2022-04-04 NOTE — DISCUSSION/SUMMARY
[FreeTextEntry1] : This is a 36-year-old male with past medical history significant for hypertension, non-insulin-dependent diabetes mellitus, dyspepsia, status post left knee arthroscopic surgery, status post cholecystectomy, who comes in for cardiac consultation.  The patient was well until March 28, 2022 when he had transient dizziness and right-sided facial numb-like feeling, and felt that his blood pressure was elevated.  He thought that he was having a stroke.  He went to [facility] where his blood pressure was elevated.  He had a CAT scan of his head which she reports is negative, and he reports normal blood work and electrocardiogram.  He was sent home for outpatient follow-up and evaluation.\par He denies chest pain, shortness of breath, dizziness or syncope.\par He has no history of rheumatic fever.  He does not drink excessive caffeine or alcohol.\par His cardiac risk factors include hypertension, and non-insulin-dependent diabetes mellitus.\par His father had diabetes mellitus and his mother had Crohn's disease.  The patient has 5 mickie children\par Electrocardiogram done April 4, 2022 demonstrate normal sinus rhythm rate 74 bpm is otherwise unremarkable.\par Patient is currently on losartan 50 mg daily which is not holding his blood pressure.\par He will start on Micardis hydrochlorothiazide 80/25 mg/day as 1 tablet in the morning, and labetalol 100 mg twice per day.\par He will follow-up with me in 1 month to reevaluate his blood pressure.\par He will also schedule echo Doppler examination to evaluate his left ventricular function, chamber size, rule out mitral valve prolapse, rule out hypertrophy and/or pericardial disease.\par The patient understands that aerobic exercises must be increased to 40 minutes 4 times per week. A detailed discussion of lifestyle modification was done today. The patient has a good understanding of the diagnosis, and treatment plan. Lifestyle modification was also outlined.

## 2022-04-04 NOTE — PHYSICAL EXAM
[Well Developed] : well developed [Well Nourished] : well nourished [No Acute Distress] : no acute distress [Normal Conjunctiva] : normal conjunctiva [Normal Venous Pressure] : normal venous pressure [No Carotid Bruit] : no carotid bruit [Normal S1, S2] : normal S1, S2 [No Rub] : no rub [5th Left ICS - MCL] : palpated at the 5th LICS in the midclavicular line [Normal] : normal [No Precordial Heave] : no precordial heave was noted [Normal Rate] : normal [Rhythm Regular] : regular [Normal S1] : normal S1 [Normal S2] : normal S2 [No Gallop] : no gallop heard [Click] : a ~M click was heard [I] : a grade 1 [No Pitting Edema] : no pitting edema present [2+] : left 2+ [No Abnormalities] : the abdominal aorta was not enlarged and no bruit was heard [Clear Lung Fields] : clear lung fields [Good Air Entry] : good air entry [No Respiratory Distress] : no respiratory distress  [Soft] : abdomen soft [Non Tender] : non-tender [No Masses/organomegaly] : no masses/organomegaly [Normal Bowel Sounds] : normal bowel sounds [Normal Gait] : normal gait [No Edema] : no edema [No Cyanosis] : no cyanosis [No Clubbing] : no clubbing [No Varicosities] : no varicosities [No Rash] : no rash [No Skin Lesions] : no skin lesions [Moves all extremities] : moves all extremities [No Focal Deficits] : no focal deficits [Normal Speech] : normal speech [Alert and Oriented] : alert and oriented [Normal memory] : normal memory [S3] : no S3 [S4] : no S4 [Right Carotid Bruit] : no bruit heard over the right carotid [Left Carotid Bruit] : no bruit heard over the left carotid [Right Femoral Bruit] : no bruit heard over the right femoral artery [Left Femoral Bruit] : no bruit heard over the left femoral artery

## 2022-04-05 RX ORDER — PANTOPRAZOLE 40 MG/1
40 TABLET, DELAYED RELEASE ORAL
Qty: 90 | Refills: 0 | Status: ACTIVE | COMMUNITY

## 2022-05-09 ENCOUNTER — APPOINTMENT (OUTPATIENT)
Dept: CARDIOLOGY | Facility: CLINIC | Age: 37
End: 2022-05-09
Payer: COMMERCIAL

## 2022-05-09 VITALS
BODY MASS INDEX: 32.47 KG/M2 | OXYGEN SATURATION: 96 % | SYSTOLIC BLOOD PRESSURE: 145 MMHG | WEIGHT: 253 LBS | RESPIRATION RATE: 15 BRPM | DIASTOLIC BLOOD PRESSURE: 90 MMHG | HEART RATE: 88 BPM | HEIGHT: 74 IN | TEMPERATURE: 98.1 F

## 2022-05-09 PROCEDURE — 93306 TTE W/DOPPLER COMPLETE: CPT

## 2022-05-09 PROCEDURE — 99214 OFFICE O/P EST MOD 30 MIN: CPT | Mod: 25

## 2022-05-09 NOTE — DISCUSSION/SUMMARY
[FreeTextEntry1] : Dr. Sanabria-(PRIOR VISIT and PMH WITH Dr. Sanabria): \par This is a 36-year-old male with past medical history significant for hypertension, non-insulin-dependent diabetes mellitus, dyspepsia, status post left knee arthroscopic surgery, status post cholecystectomy, who comes in for cardiac consultation.  \par \par The patient was well until March 28, 2022 when he had transient dizziness and right-sided facial numb-like feeling, and felt that his blood pressure was elevated.  He thought that he was having a stroke.  He went to [facility] where his blood pressure was elevated.  He had a CAT scan of his head which she reports is negative, and he reports normal blood work and electrocardiogram.  He was sent home for outpatient follow-up and evaluation.\par \par He denies chest pain, shortness of breath, dizziness or syncope.\par He has no history of rheumatic fever.  He does not drink excessive caffeine or alcohol.\par His cardiac risk factors include hypertension, and non-insulin-dependent diabetes mellitus.\par His father had diabetes mellitus and his mother had Crohn's disease. The patient has 5 mickie children.\par \par Electrocardiogram done April 4, 2022 demonstrate normal sinus rhythm rate 74 bpm is otherwise unremarkable.\par \par Patient is currently on losartan 50 mg daily which is not holding his blood pressure.\par He will start on Micardis hydrochlorothiazide 80/25 mg/day as 1 tablet in the morning, and labetalol 100 mg twice per day.\par He will follow-up with me in 1 month to reevaluate his blood pressure.\par \par He will also schedule echo Doppler examination to evaluate his left ventricular function, chamber size, rule out mitral valve prolapse, rule out hypertrophy and/or pericardial disease.\par \par The patient understands that aerobic exercises must be increased to 40 minutes 4 times per week. A detailed discussion of lifestyle modification was done today. The patient has a good understanding of the diagnosis, and treatment plan. Lifestyle modification was also outlined.

## 2022-05-09 NOTE — REASON FOR VISIT
[CV Risk Factors and Non-Cardiac Disease] : CV risk factors and non-cardiac disease [Hypertension] : hypertension [FreeTextEntry1] : This is a 36 year year old male here today for follow up cardiac evaluation. \par He has a past medical history significant for hypertension, non-insulin-dependent diabetes mellitus, dyspepsia, status post left knee arthroscopic surgery, status post cholecystectomy.\par \par CHIEF COMPLAINT:\par Today he is feeling generally well and does not have any complaints at this time. He is here for BP check and is currently on Labetalol 100mg PO BID, and Telmisartan HCTZ 80/25mg PO DAILY. \par \par He denies fever, chills, weight loss, malaise, rash, alteration bowel habits, weakness, abdominal  pain, bloating, changes in urination, visual disturbances, chest pain, headaches, dizziness, heart palpitations, recent episodes of syncope or falls at this time.\par \par \par

## 2022-05-09 NOTE — ASSESSMENT
[FreeTextEntry1] : This is a 36 year year old male here today for follow up cardiac evaluation. \par He has a past medical history significant for hypertension, non-insulin-dependent diabetes mellitus, dyspepsia, status post left knee arthroscopic surgery, status post cholecystectomy.\par \par He has no history of rheumatic fever.  He does not drink excessive caffeine or alcohol.\par His cardiac risk factors include hypertension, and non-insulin-dependent diabetes mellitus.\par His father had diabetes mellitus and his mother had Crohn's disease. The patient has 5 children.\par \par CHIEF COMPLAINT:\par Today he is feeling generally well and does not have any complaints at this time. He is here for BP check and is currently on Labetalol 100mg PO BID, and Telmisartan HCTZ 80/25mg PO DAILY. \par \par BLOOD PRESSURE:\par -BP is better controlled but remains elevated on today's exam. \par -We will increase Labetalol 100mg PO BID to 200mg BID and he will follow up in 1 month for BP check.\par \par -I have discussed the importance of maintaining good BP control and reviewed the newest guidelines with the patient while re-enforcing dietary sodium restrictions to no more than 2-3 g daily, DASH diet, life style modifications as well as the goal of maintaining ideal body weight with the patient today. I have advised the patient to avoid the use of over-the-counter medications/ supplements especially NSAIDS.\par \par I have reviewed with Mr. SILVA that serious health consequences can occur when blood pressure is not well controlled and the need for strict compliance with medication and that optimal control can significantly reduce the risk of cardiovascular disease stroke, heart attack and other organ damage. They verbally expressed understanding of the fore mentioned serious health consequences to me today.\par \par SLEEP APNEA ASSESSMENT:\par He also gives a history of occasional fatigue during the day, and snoring at nighttime. He  may have had an isolated apneic episode. The patient will schedule a sleep study to r/o sleep apnea which may be contributing to their HTN. A detailed explanation of the use of the machinery, the risks and benefits of sleep apnea were explained to the patient.  He  has a good understanding of utilization of the machinery and further recommendation will be made after the results of sleep study are available for review\par \par Sleep apnea is associated with adverse clinical consequences which an affect most organ systems. Cardiovascular disease risk includes arrhythmias, atrial fibrillation, hypertension, coronary artery disease, and stroke. Metabolic disorders include diabetes type 2, non-alcoholic fatty liver disease. Mood disorder especially depression; and cognitive decline especially in the elderly. Associations with chronic reflux/Cabrales’s esophagus some but not all inclusive. Reasons include arousal consistent with hypopnea; respiratory events most prominent in REM sleep or supine position; therefore sleep staging and body position are important for accurate diagnosis and estimation of AHI. \par \par BLOOD WORK:\par -New blood work was done 4/4/22 which demonstrated Cholesterol of 159 and LDL of 81.\par \par CHOLESTEROL CONTROL:\par -Patient will continue the advised  TLC diet and to continue follow-up for treatment of hyperlipidemia and repeat blood testing with diet and exercise. I have discussed different exercises and the importance of maintenance of optimal body weight. The importance of staying within guidelines and recommendations was stressed to the patient today and they acknowledged that they understand this to me verbally.\par \par  -Mr. SILVA was educated and advised that failure to follow-up with my medical recommendations to lower cholesterol can result in severe health consequences therefore, they will continuing a low saturated and low fat diet and to avoid excessive carbohydrates to help reduce triglycerides and that lowering LDL levels is associated with a significant decrease in serious cardiac events including but not limited to heart attack stroke and overall death. We will continue lipid lowering agents as advised based on blood test results and the patient understands to call if they develop severe muscle discomfort or if they have a reddish tinted discoloration in their urine.\par \par DIABETIC CONTROL:\par The patient's blood glucose consistent with their diabetes. A1C is reported to be 6.3%.\par I will advise the patient to keep try to stay on a low carbohydrate diet lose weight and exercise to reduce spikes in their blood sugar.  The importance of monitoring blood sugar regularly and HgBA1c level were reviewed. I have also discussed annual eye exams to prevent blindness,  monitoring urine microalbumin regularly to check for kidney damage and the importance of proper foot care and regularly checking feet to prevent sores and possibly loss of limbs was reviewed. \par \par TESTING/REPORTS:\par -Electrocardiogram done April 4, 2022 demonstrate normal sinus rhythm rate 74 bpm is otherwise unremarkable.\par \par -Echocardiogram done in the office on 5/9/22 and results are pending. \par \par PMH:\par The patient was well until March 28, 2022 when he had transient dizziness and right-sided facial numb-like feeling, and felt that his blood pressure was elevated.  He thought that he was having a stroke.  He went to [facility] where his blood pressure was elevated.  He had a CAT scan of his head which she reports is negative, and he reports normal blood work and electrocardiogram.  He was sent home for outpatient follow-up and evaluation.\par \par PLAN:\par -Start Labetalol 200mg PO BID\par -He will continue with his usual medications and will contact the office if he is having any complaints between now and their next follow up appointment.\par -Follow up in 1 month.\par \par I have discussed the plan of care with Mr. KENNEY SILVA. He is compliant with all of his medications.\par \par The patient understands that aerobic exercises must be increased to minutes 4 times/week and a detailed discussion of lifestyle modification was done today. \par The patient has a good understanding of the diagnosis, treatment plan and lifestyle modification. \par He will contact me at the office for any questions with their care or any changes in their health status.\par \par \par Zach EVANS

## 2022-05-24 RX ORDER — METFORMIN HYDROCHLORIDE 500 MG/1
500 TABLET, COATED ORAL TWICE DAILY
Refills: 0 | Status: ACTIVE | COMMUNITY

## 2022-07-25 ENCOUNTER — APPOINTMENT (OUTPATIENT)
Dept: CARDIOLOGY | Facility: CLINIC | Age: 37
End: 2022-07-25

## 2022-07-25 VITALS
HEART RATE: 72 BPM | RESPIRATION RATE: 16 BRPM | BODY MASS INDEX: 34.01 KG/M2 | SYSTOLIC BLOOD PRESSURE: 132 MMHG | DIASTOLIC BLOOD PRESSURE: 90 MMHG | HEIGHT: 74 IN | OXYGEN SATURATION: 99 % | WEIGHT: 265 LBS | TEMPERATURE: 98 F

## 2022-07-25 PROCEDURE — 99213 OFFICE O/P EST LOW 20 MIN: CPT | Mod: 25

## 2022-07-25 PROCEDURE — 93015 CV STRESS TEST SUPVJ I&R: CPT

## 2022-07-25 NOTE — DISCUSSION/SUMMARY
[FreeTextEntry1] : This is a 36-year-old male with past medical history significant for status post COVID-19 infection January 2022, hypertension, non-insulin-dependent diabetes mellitus, dyspepsia, status post left knee arthroscopic surgery, status post cholecystectomy, who comes in for cardiac follow-up evaluation.\par His cardiac risk factors include hypertension, and non-insulin-dependent diabetes mellitus.\par His father had diabetes mellitus and his mother had Crohn's disease.\par The patient had a normal exercise stress test July 25, 2022.\par Echo Doppler examination done May 9, 2022 demonstrated physiologic mitral valve regurgitation pulmonic valve regurgitation, minimal tricuspid valve regurgitation, thinning of the interatrial septum, with borderline concentric left ventricular hypertrophy and normal ejection fraction of 65%,\par The patient remained stable on labetalol 200 mg twice per day, and Micardis hydrochlorothiazide 80/25 mg/day.  He continues metformin 500 mg twice per day.\par Blood work done April 4, 2022 demonstrated a cholesterol 159, HDL of 53, triglycerides 163, LDL calculated 74 mg/dL and non-HDL cholesterol 106 mg/dL, direct LDL 61 mg/dL hemoglobin A1c is 6.3.\par PMH:\par The patient was well until March 28, 2022 when he had transient dizziness and right-sided facial numb-like feeling, and felt that his blood pressure was elevated.  He thought that he was having a stroke.  He went to [facility] where his blood pressure was elevated.  He had a CAT scan of his head which she reports is negative, and he reports normal blood work and electrocardiogram.  He was sent home for outpatient follow-up and evaluation.\par Electrocardiogram done April 4, 2022 demonstrate normal sinus rhythm rate 74 bpm is otherwise unremarkable.\par \par The patient understands that aerobic exercises must be increased to 40 minutes 4 times per week. A detailed discussion of lifestyle modification was done today. The patient has a good understanding of the diagnosis, and treatment plan. Lifestyle modification was also outlined.

## 2022-07-25 NOTE — ASSESSMENT
[FreeTextEntry1] : Prior note nurse practitioner Issa on May 9, 2022::\par \par This is a 36 year year old male here today for follow up cardiac evaluation. \par He has a past medical history significant for hypertension, non-insulin-dependent diabetes mellitus, dyspepsia, status post left knee arthroscopic surgery, status post cholecystectomy.\par \par He has no history of rheumatic fever.  He does not drink excessive caffeine or alcohol.\par His cardiac risk factors include hypertension, and non-insulin-dependent diabetes mellitus.\par His father had diabetes mellitus and his mother had Crohn's disease. The patient has 5 children.\par \par CHIEF COMPLAINT:\par Today he is feeling generally well and does not have any complaints at this time. He is here for BP check and is currently on Labetalol 100mg PO BID, and Telmisartan HCTZ 80/25mg PO DAILY. \par \par BLOOD PRESSURE:\par -BP is better controlled but remains elevated on today's exam. \par -We will increase Labetalol 100mg PO BID to 200mg BID and he will follow up in 1 month for BP check.\par \par -I have discussed the importance of maintaining good BP control and reviewed the newest guidelines with the patient while re-enforcing dietary sodium restrictions to no more than 2-3 g daily, DASH diet, life style modifications as well as the goal of maintaining ideal body weight with the patient today. I have advised the patient to avoid the use of over-the-counter medications/ supplements especially NSAIDS.\par \par I have reviewed with Mr. SILVA that serious health consequences can occur when blood pressure is not well controlled and the need for strict compliance with medication and that optimal control can significantly reduce the risk of cardiovascular disease stroke, heart attack and other organ damage. They verbally expressed understanding of the fore mentioned serious health consequences to me today.\par \par SLEEP APNEA ASSESSMENT:\par He also gives a history of occasional fatigue during the day, and snoring at nighttime. He  may have had an isolated apneic episode. The patient will schedule a sleep study to r/o sleep apnea which may be contributing to their HTN. A detailed explanation of the use of the machinery, the risks and benefits of sleep apnea were explained to the patient.  He  has a good understanding of utilization of the machinery and further recommendation will be made after the results of sleep study are available for review\par \par Sleep apnea is associated with adverse clinical consequences which an affect most organ systems. Cardiovascular disease risk includes arrhythmias, atrial fibrillation, hypertension, coronary artery disease, and stroke. Metabolic disorders include diabetes type 2, non-alcoholic fatty liver disease. Mood disorder especially depression; and cognitive decline especially in the elderly. Associations with chronic reflux/Cabrales’s esophagus some but not all inclusive. Reasons include arousal consistent with hypopnea; respiratory events most prominent in REM sleep or supine position; therefore sleep staging and body position are important for accurate diagnosis and estimation of AHI. \par \par BLOOD WORK:\par -New blood work was done 4/4/22 which demonstrated Cholesterol of 159 and LDL of 81.\par \par CHOLESTEROL CONTROL:\par -Patient will continue the advised  TLC diet and to continue follow-up for treatment of hyperlipidemia and repeat blood testing with diet and exercise. I have discussed different exercises and the importance of maintenance of optimal body weight. The importance of staying within guidelines and recommendations was stressed to the patient today and they acknowledged that they understand this to me verbally.\par \par  -Mr. SILVA was educated and advised that failure to follow-up with my medical recommendations to lower cholesterol can result in severe health consequences therefore, they will continuing a low saturated and low fat diet and to avoid excessive carbohydrates to help reduce triglycerides and that lowering LDL levels is associated with a significant decrease in serious cardiac events including but not limited to heart attack stroke and overall death. We will continue lipid lowering agents as advised based on blood test results and the patient understands to call if they develop severe muscle discomfort or if they have a reddish tinted discoloration in their urine.\par \par DIABETIC CONTROL:\par The patient's blood glucose consistent with their diabetes. A1C is reported to be 6.3%.\par I will advise the patient to keep try to stay on a low carbohydrate diet lose weight and exercise to reduce spikes in their blood sugar.  The importance of monitoring blood sugar regularly and HgBA1c level were reviewed. I have also discussed annual eye exams to prevent blindness,  monitoring urine microalbumin regularly to check for kidney damage and the importance of proper foot care and regularly checking feet to prevent sores and possibly loss of limbs was reviewed. \par \par TESTING/REPORTS:\par -Electrocardiogram done April 4, 2022 demonstrate normal sinus rhythm rate 74 bpm is otherwise unremarkable.\par \par -Echocardiogram done in the office on 5/9/22 and results are pending. \par \par PMH:\par The patient was well until March 28, 2022 when he had transient dizziness and right-sided facial numb-like feeling, and felt that his blood pressure was elevated.  He thought that he was having a stroke.  He went to [facility] where his blood pressure was elevated.  He had a CAT scan of his head which she reports is negative, and he reports normal blood work and electrocardiogram.  He was sent home for outpatient follow-up and evaluation.\par \par PLAN:\par -Start Labetalol 200mg PO BID\par -He will continue with his usual medications and will contact the office if he is having any complaints between now and their next follow up appointment.\par -Follow up in 1 month.\par \par I have discussed the plan of care with Mr. KENNEY SILVA. He is compliant with all of his medications.\par \par The patient understands that aerobic exercises must be increased to minutes 4 times/week and a detailed discussion of lifestyle modification was done today. \par The patient has a good understanding of the diagnosis, treatment plan and lifestyle modification. \par He will contact me at the office for any questions with their care or any changes in their health status.\par \par \par Zach EVANS

## 2022-07-25 NOTE — REASON FOR VISIT
[CV Risk Factors and Non-Cardiac Disease] : CV risk factors and non-cardiac disease [Hypertension] : hypertension [FreeTextEntry1] : This is a 36 year year old male here today for follow up cardiac evaluation. \par He has a past medical history significant for hypertension, non-insulin-dependent diabetes mellitus, dyspepsia, status post left knee arthroscopic surgery, status post cholecystectomy.\par \par CHIEF COMPLAINT:\par Today he is feeling generally well and does not have any complaints at this time. He is here for cardiac evaluation and is currently on Labetalol 100mg PO BID, and Telmisartan HCTZ 80/25mg PO DAILY. \par \par He denies fever, chills, weight loss, malaise, rash, alteration bowel habits, weakness, abdominal  pain, bloating, changes in urination, visual disturbances, chest pain, headaches, dizziness, heart palpitations, recent episodes of syncope or falls at this time.\par \par \par

## 2022-11-08 NOTE — PATIENT PROFILE ADULT - [id number]_GEN_A_NUR
4/5/2021 - bilateral cortical cataracts with vision worse in the left eye leading to switched fixation  6/24/2021 - Since can begin fusing with press on prism, will refer for bilateral cataract extraction  10/18/2021 - SP bilateral cataract extraction  5/1/2022 - some residual myopia and astigmatism. Will give rx with bifocal  11/8/2022-never got glasses Rx will give Rx   verbal instruction

## 2022-12-19 ENCOUNTER — APPOINTMENT (OUTPATIENT)
Dept: CARDIOLOGY | Facility: CLINIC | Age: 37
End: 2022-12-19

## 2023-02-06 ENCOUNTER — LABORATORY RESULT (OUTPATIENT)
Age: 38
End: 2023-02-06

## 2023-02-06 ENCOUNTER — APPOINTMENT (OUTPATIENT)
Dept: CARDIOLOGY | Facility: CLINIC | Age: 38
End: 2023-02-06
Payer: COMMERCIAL

## 2023-02-06 ENCOUNTER — NON-APPOINTMENT (OUTPATIENT)
Age: 38
End: 2023-02-06

## 2023-02-06 VITALS
RESPIRATION RATE: 16 BRPM | TEMPERATURE: 98.2 F | SYSTOLIC BLOOD PRESSURE: 138 MMHG | HEART RATE: 67 BPM | WEIGHT: 250 LBS | BODY MASS INDEX: 33.13 KG/M2 | HEIGHT: 73 IN | DIASTOLIC BLOOD PRESSURE: 84 MMHG | OXYGEN SATURATION: 98 %

## 2023-02-06 PROCEDURE — 99214 OFFICE O/P EST MOD 30 MIN: CPT | Mod: 25

## 2023-02-06 PROCEDURE — 93000 ELECTROCARDIOGRAM COMPLETE: CPT

## 2023-02-06 NOTE — ASSESSMENT
[FreeTextEntry1] : This is a 37 year year old male here today for follow up cardiac evaluation. \par He has a past medical history significant for hypertension, non-insulin-dependent diabetes mellitus, dyspepsia, status post left knee arthroscopic surgery, status post cholecystectomy.\par \par He has no history of rheumatic fever.  He does not drink excessive caffeine or alcohol.\par His cardiac risk factors include hypertension, and non-insulin-dependent diabetes mellitus.\par His father had diabetes mellitus and his mother had Crohn's disease. The patient has 5 children.\par \par -He does music for living\par \par CHIEF COMPLAINT:\par Today he is feeling generally well and is currently prescribed labetalol 200 mg 1 tablet twice daily and telmisartan hydrochlorothiazide 80-25 mg daily.  He states that he took his blood pressure at this morning and it was slightly elevated but he did have Chinese food late last night for dinner.\par \par BLOOD PRESSURE:\par -BP is better controlled on today's exam.\par \par -I have discussed the importance of maintaining good BP control and reviewed the newest guidelines with the patient while re-enforcing dietary sodium restrictions to no more than 2-3 g daily, DASH diet, life style modifications as well as the goal of maintaining ideal body weight with the patient today. I have advised the patient to avoid the use of over-the-counter medications/ supplements especially NSAIDS.\par \par I have reviewed with Mr. SILVA that serious health consequences can occur when blood pressure is not well controlled and the need for strict compliance with medication and that optimal control can significantly reduce the risk of cardiovascular disease stroke, heart attack and other organ damage. They verbally expressed understanding of the fore mentioned serious health consequences to me today.\par \par SLEEP APNEA ASSESSMENT:\par Sleep apnea is associated with adverse clinical consequences which an affect most organ systems. Cardiovascular disease risk includes arrhythmias, atrial fibrillation, hypertension, coronary artery disease, and stroke. Metabolic disorders include diabetes type 2, non-alcoholic fatty liver disease. Mood disorder especially depression; and cognitive decline especially in the elderly. Associations with chronic reflux/Cabrales’s esophagus some but not all inclusive. Reasons include arousal consistent with hypopnea; respiratory events most prominent in REM sleep or supine position; therefore sleep staging and body position are important for accurate diagnosis and estimation of AHI. \par \par BLOOD WORK:\par -New blood work was done 02/06/2023  to evaluate lipid profile, CBC, BMP, hepatic function, A1C and TSH. \par -New blood work was done 4/4/22 which demonstrated Cholesterol of 159 and LDL of 81.\par \par CHOLESTEROL CONTROL:\par -Patient will continue the advised  TLC diet and to continue follow-up for treatment of hyperlipidemia and repeat blood testing with diet and exercise. I have discussed different exercises and the importance of maintenance of optimal body weight. The importance of staying within guidelines and recommendations was stressed to the patient today and they acknowledged that they understand this to me verbally.\par \par  -Mr. SILVA was educated and advised that failure to follow-up with my medical recommendations to lower cholesterol can result in severe health consequences therefore, they will continuing a low saturated and low fat diet and to avoid excessive carbohydrates to help reduce triglycerides and that lowering LDL levels is associated with a significant decrease in serious cardiac events including but not limited to heart attack stroke and overall death. We will continue lipid lowering agents as advised based on blood test results and the patient understands to call if they develop severe muscle discomfort or if they have a reddish tinted discoloration in their urine.\par \par DIABETIC CONTROL:\par I will advise the patient to keep try to stay on a low carbohydrate diet lose weight and exercise to reduce spikes in their blood sugar.  The importance of monitoring blood sugar regularly and HgBA1c level were reviewed. I have also discussed annual eye exams to prevent blindness,  monitoring urine microalbumin regularly to check for kidney damage and the importance of proper foot care and regularly checking feet to prevent sores and possibly loss of limbs was reviewed. \par \par TESTING/REPORTS:\par -EKG done 02/06/2023 which demonstrated regular sinus rhythm with nonspecific ST-T wave changes BPM of 67.\par \par -The patient had a normal exercise stress test July 25, 2022.\par \par -Echo Doppler examination done May 9, 2022 demonstrated physiologic mitral valve regurgitation pulmonic valve regurgitation, minimal tricuspid valve regurgitation, thinning of the interatrial septum, with borderline concentric left ventricular hypertrophy and normal ejection fraction of 65%\par \par -Electrocardiogram done April 4, 2022 demonstrate normal sinus rhythm rate 74 bpm is otherwise unremarkable.\par \par -Echocardiogram done in the office on 5/9/22 and results are pending. \par \par PMH:\par The patient was well until March 28, 2022 when he had transient dizziness and right-sided facial numb-like feeling, and felt that his blood pressure was elevated.  He thought that he was having a stroke.  He went to [facility] where his blood pressure was elevated.  He had a CAT scan of his head which she reports is negative, and he reports normal blood work and electrocardiogram.  He was sent home for outpatient follow-up and evaluation.\par \par PLAN:\par -He will continue with his usual medications and will contact the office if he is having any complaints between now and their next follow up appointment.\par -He will follow-up with his primary care doctor routinely.\par -f/u 6 months.\par \par I have discussed the plan of care with Mr. KENNEY SILVA. He is compliant with all of his medications.\par \par The patient understands that aerobic exercises must be increased to minutes 4 times/week and a detailed discussion of lifestyle modification was done today. \par The patient has a good understanding of the diagnosis, treatment plan and lifestyle modification. \par He will contact me at the office for any questions with their care or any changes in their health status.\par \par \par Zach EVANS

## 2023-02-06 NOTE — DISCUSSION/SUMMARY
[FreeTextEntry1] : Dr. Sanabria-(PRIOR VISIT and PMH WITH Dr. Sanabria): \par This is a 36-year-old male with past medical history significant for status post COVID-19 infection January 2022, hypertension, non-insulin-dependent diabetes mellitus, dyspepsia, status post left knee arthroscopic surgery, status post cholecystectomy, who comes in for cardiac follow-up evaluation.\par His cardiac risk factors include hypertension, and non-insulin-dependent diabetes mellitus.\par \par His father had diabetes mellitus and his mother had Crohn's disease.\par \par The patient had a normal exercise stress test July 25, 2022.\par \par Echo Doppler examination done May 9, 2022 demonstrated physiologic mitral valve regurgitation pulmonic valve regurgitation, minimal tricuspid valve regurgitation, thinning of the interatrial septum, with borderline concentric left ventricular hypertrophy and normal ejection fraction of 65%\par \par The patient remained stable on labetalol 200 mg twice per day, and Micardis hydrochlorothiazide 80/25 mg/day.  He continues metformin 500 mg twice per day.\par \par Blood work done April 4, 2022 demonstrated a cholesterol 159, HDL of 53, triglycerides 163, LDL calculated 74 mg/dL and non-HDL cholesterol 106 mg/dL, direct LDL 61 mg/dL hemoglobin A1c is 6.3.\par \par PMH:\par The patient was well until March 28, 2022 when he had transient dizziness and right-sided facial numb-like feeling, and felt that his blood pressure was elevated.  He thought that he was having a stroke.  He went to [facility] where his blood pressure was elevated.  He had a CAT scan of his head which she reports is negative, and he reports normal blood work and electrocardiogram.  He was sent home for outpatient follow-up and evaluation.\par \par Electrocardiogram done April 4, 2022 demonstrate normal sinus rhythm rate 74 bpm is otherwise unremarkable.\par \par The patient understands that aerobic exercises must be increased to 40 minutes 4 times per week. A detailed discussion of lifestyle modification was done today. The patient has a good understanding of the diagnosis, and treatment plan. Lifestyle modification was also outlined.

## 2023-02-06 NOTE — REASON FOR VISIT
[CV Risk Factors and Non-Cardiac Disease] : CV risk factors and non-cardiac disease [Hypertension] : hypertension [FreeTextEntry1] : This is a 37 year year old male here today for follow up cardiac evaluation. \par He has a past medical history significant for hypertension, non-insulin-dependent diabetes mellitus, dyspepsia, status post left knee arthroscopic surgery, status post cholecystectomy.\par \par \par \par

## 2023-06-19 ENCOUNTER — EMERGENCY (EMERGENCY)
Facility: HOSPITAL | Age: 38
LOS: 0 days | Discharge: HOME | End: 2023-06-20
Attending: STUDENT IN AN ORGANIZED HEALTH CARE EDUCATION/TRAINING PROGRAM
Payer: COMMERCIAL

## 2023-06-19 VITALS
DIASTOLIC BLOOD PRESSURE: 91 MMHG | HEART RATE: 69 BPM | SYSTOLIC BLOOD PRESSURE: 145 MMHG | RESPIRATION RATE: 18 BRPM | OXYGEN SATURATION: 100 % | TEMPERATURE: 98 F | WEIGHT: 179.9 LBS

## 2023-06-19 DIAGNOSIS — S50.811A ABRASION OF RIGHT FOREARM, INITIAL ENCOUNTER: ICD-10-CM

## 2023-06-19 DIAGNOSIS — S80.211A ABRASION, RIGHT KNEE, INITIAL ENCOUNTER: ICD-10-CM
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
## 2023-06-19 DIAGNOSIS — E11.9 TYPE 2 DIABETES MELLITUS WITHOUT COMPLICATIONS: ICD-10-CM

## 2023-06-19 DIAGNOSIS — Z87.81 PERSONAL HISTORY OF (HEALED) TRAUMATIC FRACTURE: Chronic | ICD-10-CM

## 2023-06-19 DIAGNOSIS — Z98.890 OTHER SPECIFIED POSTPROCEDURAL STATES: Chronic | ICD-10-CM

## 2023-06-19 PROCEDURE — 99284 EMERGENCY DEPT VISIT MOD MDM: CPT

## 2023-06-20 VITALS
DIASTOLIC BLOOD PRESSURE: 70 MMHG | OXYGEN SATURATION: 99 % | RESPIRATION RATE: 18 BRPM | SYSTOLIC BLOOD PRESSURE: 130 MMHG | HEART RATE: 68 BPM

## 2023-06-20 NOTE — ED PROVIDER NOTE - PATIENT PORTAL LINK FT
You can access the FollowMyHealth Patient Portal offered by [facility] by registering at the following website: [email or web address]. By joining [employer]’s FollowMyHealth portal, you will also be able to view your health information using other applications (apps) compatible with our system.
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
## 2023-06-20 NOTE — ED ADULT NURSE NOTE - NSFALLUNIVINTERV_ED_ALL_ED
Bed/Stretcher in lowest position, wheels locked, appropriate side rails in place/Call bell, personal items and telephone in reach/Instruct patient to call for assistance before getting out of bed/chair/stretcher/Non-slip footwear applied when patient is off stretcher/[place] to call system/Physically safe environment - no spills, clutter or unnecessary equipment/Purposeful proactive rounding/Room/bathroom lighting operational, light cord in reach

## 2023-06-20 NOTE — ED PROVIDER NOTE - PHYSICAL EXAMINATION
General: well appearing in NAD, AxOx3  HEENT: NC/AT, PERRL  Resp: no respiratory distress, tachypna, or accessory muscle use  Ext: no obvious deformities, maintains full rom of all joints.   Skin: abrasion over R knee. No active bleeding  Neuro: AxOx3, speaking full sentences, ambulatory
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
## 2023-08-08 ENCOUNTER — LABORATORY RESULT (OUTPATIENT)
Age: 38
End: 2023-08-08

## 2023-08-08 ENCOUNTER — NON-APPOINTMENT (OUTPATIENT)
Age: 38
End: 2023-08-08

## 2023-08-08 ENCOUNTER — APPOINTMENT (OUTPATIENT)
Dept: CARDIOLOGY | Facility: CLINIC | Age: 38
End: 2023-08-08

## 2023-08-08 ENCOUNTER — APPOINTMENT (OUTPATIENT)
Dept: CARDIOLOGY | Facility: CLINIC | Age: 38
End: 2023-08-08
Payer: COMMERCIAL

## 2023-08-08 VITALS
WEIGHT: 230 LBS | TEMPERATURE: 98.3 F | RESPIRATION RATE: 16 BRPM | BODY MASS INDEX: 30.48 KG/M2 | HEART RATE: 61 BPM | DIASTOLIC BLOOD PRESSURE: 88 MMHG | HEIGHT: 73 IN | OXYGEN SATURATION: 98 % | SYSTOLIC BLOOD PRESSURE: 120 MMHG

## 2023-08-08 DIAGNOSIS — R06.81 APNEA, NOT ELSEWHERE CLASSIFIED: ICD-10-CM

## 2023-08-08 DIAGNOSIS — R06.83 SNORING: ICD-10-CM

## 2023-08-08 DIAGNOSIS — R40.0 SOMNOLENCE: ICD-10-CM

## 2023-08-08 DIAGNOSIS — R06.09 OTHER FORMS OF DYSPNEA: ICD-10-CM

## 2023-08-08 PROCEDURE — 99214 OFFICE O/P EST MOD 30 MIN: CPT

## 2023-08-08 NOTE — DISCUSSION/SUMMARY
[FreeTextEntry1] :  This is a 37-year-old male with past medical history significant for status post COVID-19 infection January 2022, hypertension, non-insulin-dependent diabetes mellitus, dyspepsia, status post left knee arthroscopic surgery, status post cholecystectomy, who comes in for cardiac follow-up evaluation. His cardiac risk factors include hypertension, and non-insulin-dependent diabetes mellitus. His father had diabetes mellitus and his mother had Crohn's disease. The patient has also been complaining of snoring, some daytime fatigue and may have had an apneic episode. Electrocardiogram done August 8, 2023 demonstrated normal sinus rhythm rate 61 bpm is otherwise unremarkable. I have asked the patient to schedule a home sleep study.  He will do this to rule out sleep apnea. Lipid panel done February 6, 2023 demonstrated a cholesterol 159, HDL 51, triglycerides 110, LDL direct 91, hemoglobin A1c of 6.3. The patient had a normal exercise stress test July 25, 2022.  Echo Doppler examination done May 9, 2022 demonstrated physiologic mitral valve regurgitation pulmonic valve regurgitation, minimal tricuspid valve regurgitation, thinning of the interatrial septum, with borderline concentric left ventricular hypertrophy and normal ejection fraction of 65% The patient's blood pressure is under adequate control.  He will continue his current dose of Micardis hydrochlorothiazide 80/25 mg daily, labetalol 200 mg twice per day. The patient remained stable on labetalol 200 mg twice per day, and Micardis hydrochlorothiazide 80/25 mg/day.  He continues metformin 500 mg twice per day.  Blood work done April 4, 2022 demonstrated a cholesterol 159, HDL of 53, triglycerides 163, LDL calculated 74 mg/dL and non-HDL cholesterol 106 mg/dL, direct LDL 61 mg/dL hemoglobin A1c is 6.3.  PMH: The patient was well until March 28, 2022 when he had transient dizziness and right-sided facial numb-like feeling, and felt that his blood pressure was elevated.  He thought that he was having a stroke.  He went to [facility] where his blood pressure was elevated.  He had a CAT scan of his head which she reports is negative, and he reports normal blood work and electrocardiogram.  He was sent home for outpatient follow-up and evaluation.  Electrocardiogram done April 4, 2022 demonstrate normal sinus rhythm rate 74 bpm is otherwise unremarkable.  The patient understands that aerobic exercises must be increased to 40 minutes 4 times per week. A detailed discussion of lifestyle modification was done today. The patient has a good understanding of the diagnosis, and treatment plan. Lifestyle modification was also outlined.

## 2023-08-08 NOTE — ASSESSMENT
[FreeTextEntry1] : Prior note nurse practitioner Issa February 6, 2023::  This is a 37 year year old male here today for follow up cardiac evaluation.  He has a past medical history significant for hypertension, non-insulin-dependent diabetes mellitus, dyspepsia, status post left knee arthroscopic surgery, status post cholecystectomy.  He has no history of rheumatic fever.  He does not drink excessive caffeine or alcohol. His cardiac risk factors include hypertension, and non-insulin-dependent diabetes mellitus. His father had diabetes mellitus and his mother had Crohn's disease. The patient has 5 children.  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  He states that he took his blood pressure at this morning and it was slightly elevated but he did have Chinese food late last night for dinner.  BLOOD PRESSURE: -BP is better controlled on today's exam.  -I have discussed the importance of maintaining good BP control and reviewed the newest guidelines with the patient while re-enforcing dietary sodium restrictions to no more than 2-3 g daily, DASH diet, life style modifications as well as the goal of maintaining ideal body weight with the patient today. I have advised the patient to avoid the use of over-the-counter medications/ supplements especially NSAIDS.  I have reviewed with Mr. SILVA that serious health consequences can occur when blood pressure is not well controlled and the need for strict compliance with medication and that optimal control can significantly reduce the risk of cardiovascular disease stroke, heart attack and other organ damage. They verbally expressed understanding of the fore mentioned serious health consequences to me today.  SLEEP APNEA ASSESSMENT: Sleep apnea is associated with adverse clinical consequences which an affect most organ systems. Cardiovascular disease risk includes arrhythmias, atrial fibrillation, hypertension, coronary artery disease, and stroke. Metabolic disorders include diabetes type 2, non-alcoholic fatty liver disease. Mood disorder especially depression; and cognitive decline especially in the elderly. Associations with chronic reflux/Cabrales's esophagus some but not all inclusive. Reasons include arousal consistent with hypopnea; respiratory events most prominent in REM sleep or supine position; therefore sleep staging and body position are important for accurate diagnosis and estimation of AHI.   BLOOD WORK: -New blood work was done 02/06/2023  to evaluate lipid profile, CBC, BMP, hepatic function, A1C and TSH.  -New blood work was done 4/4/22 which demonstrated Cholesterol of 159 and LDL of 81.  CHOLESTEROL CONTROL: -Patient will continue the advised  TLC diet and to continue follow-up for treatment of hyperlipidemia and repeat blood testing with diet and exercise. I have discussed different exercises and the importance of maintenance of optimal body weight. The importance of staying within guidelines and recommendations was stressed to the patient today and they acknowledged that they understand this to me verbally.   -Mr. SILVA was educated and advised that failure to follow-up with my medical recommendations to lower cholesterol can result in severe health consequences therefore, they will continuing a low saturated and low fat diet and to avoid excessive carbohydrates to help reduce triglycerides and that lowering LDL levels is associated with a significant decrease in serious cardiac events including but not limited to heart attack stroke and overall death. We will continue lipid lowering agents as advised based on blood test results and the patient understands to call if they develop severe muscle discomfort or if they have a reddish tinted discoloration in their urine.  DIABETIC CONTROL: I will advise the patient to keep try to stay on a low carbohydrate diet lose weight and exercise to reduce spikes in their blood sugar.  The importance of monitoring blood sugar regularly and HgBA1c level were reviewed. I have also discussed annual eye exams to prevent blindness,  monitoring urine microalbumin regularly to check for kidney damage and the importance of proper foot care and regularly checking feet to prevent sores and possibly loss of limbs was reviewed.   TESTING/REPORTS: -EKG done 02/06/2023 which demonstrated regular sinus rhythm with nonspecific ST-T wave changes BPM of 67.  -The patient had a normal exercise stress test July 25, 2022.  -Echo Doppler examination done May 9, 2022 demonstrated physiologic mitral valve regurgitation pulmonic valve regurgitation, minimal tricuspid valve regurgitation, thinning of the interatrial septum, with borderline concentric left ventricular hypertrophy and normal ejection fraction of 65%  -Electrocardiogram done April 4, 2022 demonstrate normal sinus rhythm rate 74 bpm is otherwise unremarkable.  -Echocardiogram done in the office on 5/9/22 and results are pending.   PMH: The patient was well until March 28, 2022 when he had transient dizziness and right-sided facial numb-like feeling, and felt that his blood pressure was elevated.  He thought that he was having a stroke.  He went to [facility] where his blood pressure was elevated.  He had a CAT scan of his head which she reports is negative, and he reports normal blood work and electrocardiogram.  He was sent home for outpatient follow-up and evaluation.  PLAN: -He will continue with his usual medications and will contact the office if he is having any complaints between now and their next follow up appointment. -He will follow-up with his primary care doctor routinely. -f/u 6 months.  I have discussed the plan of care with Mr. KENNEY SILVA. He is compliant with all of his medications.  The patient understands that aerobic exercises must be increased to minutes 4 times/week and a detailed discussion of lifestyle modification was done today.  The patient has a good understanding of the diagnosis, treatment plan and lifestyle modification.  He will contact me at the office for any questions with their care or any changes in their health status.   Zach EVANS

## 2023-08-08 NOTE — REASON FOR VISIT
[CV Risk Factors and Non-Cardiac Disease] : CV risk factors and non-cardiac disease [Hypertension] : hypertension [FreeTextEntry1] : This is a 37 year old male here today for follow up cardiac evaluation.  He has a past medical history significant for hypertension, non-insulin-dependent diabetes mellitus, dyspepsia, status post left knee arthroscopic surgery, status post cholecystectomy. Pt reports nighttime snoring and daytime fatigue which he believes it is secondary to his job. Pt reports he may have an intermittent apneic episode.

## 2023-12-18 ENCOUNTER — APPOINTMENT (OUTPATIENT)
Dept: CARDIOLOGY | Facility: CLINIC | Age: 38
End: 2023-12-18

## 2024-01-02 ENCOUNTER — APPOINTMENT (OUTPATIENT)
Dept: CARDIOLOGY | Facility: CLINIC | Age: 39
End: 2024-01-02

## 2024-04-11 ENCOUNTER — RX RENEWAL (OUTPATIENT)
Age: 39
End: 2024-04-11

## 2024-04-12 RX ORDER — TELMISARTAN AND HYDROCHLOROTHIAZIDE 80; 25 MG/1; MG/1
80-25 TABLET ORAL DAILY
Qty: 90 | Refills: 1 | Status: ACTIVE | COMMUNITY
Start: 2022-04-04 | End: 1900-01-01

## 2024-04-18 ENCOUNTER — EMERGENCY (EMERGENCY)
Facility: HOSPITAL | Age: 39
LOS: 0 days | Discharge: LEFT BEFORE DISCHARGE | End: 2024-04-18
Payer: COMMERCIAL

## 2024-04-18 VITALS
TEMPERATURE: 98 F | DIASTOLIC BLOOD PRESSURE: 86 MMHG | OXYGEN SATURATION: 99 % | HEART RATE: 71 BPM | WEIGHT: 223.11 LBS | RESPIRATION RATE: 15 BRPM | SYSTOLIC BLOOD PRESSURE: 127 MMHG | HEIGHT: 73.5 IN

## 2024-04-18 DIAGNOSIS — Z53.21 PROCEDURE AND TREATMENT NOT CARRIED OUT DUE TO PATIENT LEAVING PRIOR TO BEING SEEN BY HEALTH CARE PROVIDER: ICD-10-CM

## 2024-04-18 DIAGNOSIS — R10.9 UNSPECIFIED ABDOMINAL PAIN: ICD-10-CM

## 2024-04-18 DIAGNOSIS — Z87.81 PERSONAL HISTORY OF (HEALED) TRAUMATIC FRACTURE: Chronic | ICD-10-CM

## 2024-04-18 DIAGNOSIS — Z98.890 OTHER SPECIFIED POSTPROCEDURAL STATES: Chronic | ICD-10-CM

## 2024-04-18 PROCEDURE — L9991: CPT

## 2024-04-18 PROCEDURE — 93010 ELECTROCARDIOGRAM REPORT: CPT

## 2024-04-18 NOTE — ED ADULT TRIAGE NOTE - WEIGHT IN LBS
Problem: Pain  Goal: #Acceptable pain level achieved/maintained at rest using NRS/Faces  Description: This goal is used for patients who can self-report.  Acceptable means the level is at or below the identified comfort/function goal.  Outcome: Outcome Met, Continue evaluating goal progress toward completion     Problem: Diabetes  Goal: Achieves glycemic balance  Description: Goal is to maintain blood sugar within range with no episodes of hypoglycemia  Outcome: Outcome Met, Continue evaluating goal progress toward completion     Problem: Activity Intolerance  Goal: # Functional status is maintained or returned to baseline  Outcome: Outcome Met, Continue evaluating goal progress toward completion     Problem: Pressure Injury Actual  Goal: # No deterioration in pressure injury (PI)  Outcome: Outcome Met, Continue evaluating goal progress toward completion     Problem: Postoperative Care  Goal: Vital signs are maintained within parameters  Outcome: Outcome Met, Continue evaluating goal progress toward completion  Flowsheets  Taken 11/14/2023 2156 by Ankita Jarquin RN  BP: 152/79 !  Pulse: 79  Taken 11/14/2023 1939 by Ankita Jarquin RN  Resp: 16  Taken 11/14/2023 1238 by Denise Mosher CNA  Temp: 98.1 °F (36.7 °C)  SpO2: 98 %     Problem: VTE, Risk for  Goal: # No s/s of VTE  Outcome: Outcome Met, Continue evaluating goal progress toward completion     Problem: Pressure Injury, Risk for  Goal: # Skin remains intact  Outcome: Outcome Met, Continue evaluating goal progress toward completion  Note: Pt turned every two hours       Problem: At Risk for Falls  Goal: # Patient does not fall  Outcome: Outcome Met, Continue evaluating goal progress toward completion  Note: Pt's bed in low position, call light, bed alarm on      223.1

## 2024-04-18 NOTE — ED ADULT TRIAGE NOTE - CHIEF COMPLAINT QUOTE
Came in for intermittent abdominal pain, chest pain and left arm numbness started 7am today. PMH DM, HTN.

## 2024-04-18 NOTE — ED ADULT TRIAGE NOTE - BP NONINVASIVE DIASTOLIC (MM HG)
Emergency Physician Note        Note Open Time: 2:08 AM EDT    Chief Complaint  Abdominal Pain (lower abd pain. pt has been seen for this before )       History of Present Illness  Yessica Sagastume is a 42 y.o. female who presents to the ED for abdominal pain. Patient reports she had chronic abdominal pain for several months and has had an unintentional weight loss of about 60 pounds. She is followed up with gastroenterology and does not have any clear diagnosis at this time. The current working diagnosis is that she has scar tissue from prior abdominal surgeries. She denies any fevers, chills or sweats. She states she has mostly food avoidance but not very often any vomiting. She denies diarrhea or any bloody bowel movements. She recently had EGD and colonoscopy performed. She denies any chest pain or shortness of breath or any urinary or vaginal symptoms. She states she has become frustrated with ineffective treatments. Patient reports that the pain is crampy in nature. Previously she states the GI cocktail did help her pain on 1 prior visit. 10 systems reviewed, pertinent positives per HPI otherwise noted to be negative    I have reviewed the following from the nursing documentation:      Prior to Admission medications    Medication Sig Start Date End Date Taking? Authorizing Provider   amoxicillin (AMOXIL) 875 MG tablet Take 1 tablet by mouth 2 times daily for 7 days 6/1/21 6/8/21  Blake Krueger DO   gabapentin (NEURONTIN) 300 MG capsule Take 1 capsule by mouth 3 times daily for 30 days.  5/25/21 6/24/21  Blake Krueger DO   doxepin (SINEQUAN) 10 MG capsule Take 1 capsule by mouth nightly 5/13/21   Blake Krueger DO   Insulin Pen Needle (B-D UF III MINI PEN NEEDLES) 31G X 5 MM MISC Inject 1 each into the skin 4 times daily 5/8/21   Blake Krueger DO   pantoprazole (PROTONIX) 40 MG tablet Take 1 tablet by mouth daily  Patient taking differently: Take 40 mg by mouth 2 times daily  5/6/21 Prince Enoch MD   ondansetron (ZOFRAN ODT) 4 MG disintegrating tablet Take 1 tablet by mouth every 8 hours as needed for Nausea 4/26/21   Kayla Morse DO   dicyclomine (BENTYL) 10 MG capsule Take 1 capsule by mouth 4 times daily (before meals and nightly) Do not fill until desired. 4/26/21   Kayla Morse DO   metFORMIN (GLUCOPHAGE) 1000 MG tablet Take 1 tablet by mouth 2 times daily (with meals) 4/9/21   Prince Enoch MD   magnesium citrate solution Take 296 mLs by mouth once for 1 dose 4/9/21 4/9/21  Prince Enoch MD   Insulin Glargine, 2 Unit Dial, (TOUJEO MAX SOLOSTAR) 300 UNIT/ML SOPN Inject 60 Units into the skin daily 3/3/21   IRASEMA Johnson - CNP   losartan (COZAAR) 50 MG tablet Take 1 tablet by mouth daily 3/3/21   IRASEMA Johnson - CNP   Liraglutide (VICTOZA) 18 MG/3ML SOPN SC injection Inject 1.2 mg into the skin daily 1/19/21   Matthew Clarke MD   fluticasone [facility]) 50 MCG/ACT nasal spray 2 sprays by Nasal route daily 11/12/19   EVETTE Macias   insulin lispro (HUMALOG KWIKPEN) 100 UNIT/ML pen INJECT 20 UNITS SUBCUTANEOUSLY THREE TIMES DAILY BEFORE MEAL(S) 8/5/19   Blake Krueger DO   blood glucose test strips (ASCENSIA AUTODISC VI;ONE TOUCH ULTRA TEST VI) strip DX: E11.9-One touch ultra strips. FSBS 3 times daily 8/1/18   EVETTE Stone   Lancets MISC DX: E 11.9-Uses insulin.  FSBS 3 times daily-Delica lancets for one touch ultra II 6/10/16   Blake Krueger DO   glucose monitoring kit (FREESTYLE) monitoring kit Dx E11.9-One touch ultra II meter-uses tid 6/10/16   Blake Krueger DO       Allergies as of 06/07/2021 - Fully Reviewed 06/07/2021   Allergen Reaction Noted    Lisinopril Swelling 03/03/2021    Lisinopril-hydrochlorothiazide Swelling 05/06/2021    Morphine Nausea And Vomiting 01/09/2014       Past Medical History:   Diagnosis Date    Abdominal pain, acute, right lower quadrant 5/15/2013    Anxiety     Arthritis     Left Knee    Bilateral ovarian cysts 2013    Blood transfusion reaction     Cellulitis and abscess of trunk 2014    Pt was seen in ED today for bleeding from incision after taking a fall this morning.       COVID-19 2020    Depression     Diabetes mellitus ([facility] [address] [address]     x5yr    Diverticula of colon 2013    DM2 (diabetes mellitus, type 2) ([facility] [address] 2016    Insomnia     Insomnia secondary to situational depression 2014    Left carpal tunnel syndrome 2018    MDRO (multiple drug resistant organisms) resistance     poss MRSA after hysterectomy treated with antibiotics    OA (osteoarthritis) of knee 2014    Obesity (BMI 30.0-34.9) 2017    Ovarian cyst     Plantar fasciitis, left 2014        Surgical History:   Past Surgical History:   Procedure Laterality Date    BREAST CYST ASPIRATION       SECTION      CHOLECYSTECTOMY      COLONOSCOPY  2007    polyps    COLONOSCOPY N/A 2021    COLONOSCOPY WITH ANESTHESIA performed by Beryl Mckenna MD at [address]  2008    LAPAROSCOPY      lysis of adhesions    LAPAROTOMY  2014    LAPAROTOMY, BILATERAL SALPINGO - OOPHORECTOMY    SALPINGO-OOPHORECTOMY  2014    UPPER GASTROINTESTINAL ENDOSCOPY N/A 2021    EGD BIOPSY performed by Beryl Mckenna MD at [address]        Family History:    Family History   Problem Relation Age of Onset   Eileen Latin Cancer Mother         ovarian    Diabetes Mother     High Blood Pressure Mother     Hypertension Mother     Diabetes Brother     Hypertension Brother     Diabetes Maternal Grandmother     Cancer Maternal Grandfather         stomach    Prostate Cancer Paternal Grandfather         metastatic    Breast Cancer Maternal Aunt        Social History     Socioeconomic History    Marital status:      Spouse name: Not on file    Number of children: Not on file    Years of education: Not on file    Highest education level: Not on file Occupational History    Not on file   Tobacco Use    Smoking status: Former Smoker     Years: 0.00     Types: Cigarettes     Quit date: 1999     Years since quittin.7    Smokeless tobacco: Never Used    Tobacco comment: smoked for 1 months when teenager   Vaping Use    Vaping Use: Never used   Substance and Sexual Activity    Alcohol use: Yes     Comment: social drinker  4 beer per month    Drug use: No     Comment: Hx of cocaine use in 2004- 9 months     Sexual activity: Yes     Partners: Male     Comment: painful intercourse    Other Topics Concern    Not on file   Social History Narrative    Not on file     Social Determinants of Health     Financial Resource Strain:     Difficulty of Paying Living Expenses:    Food Insecurity:     Worried About Running Out of Food in the Last Year:     [address] in the Last Year:    Transportation Needs:     Lack of Transportation (Medical):  Lack of Transportation (Non-Medical):    Physical Activity:     Days of Exercise per Week:     Minutes of Exercise per Session:    Stress:     Feeling of Stress :    Social Connections:     Frequency of Communication with Friends and Family:     Frequency of Social Gatherings with Friends and Family:     Attends Baptism Services:     Active Member of Clubs or Organizations:     Attends Club or Organization Meetings:     Marital Status:    Intimate Partner Violence:     Fear of Current or Ex-Partner:     Emotionally Abused:     Physically Abused:     Sexually Abused:        Nursing notes reviewed. ED Triage Vitals [21 2232]   Enc Vitals Group      /85      Pulse 105      Resp 16      Temp 98 °F (36.7 °C)      Temp Source Oral      SpO2 98 %      Weight 135 lb (61.2 kg)      Height 5' 1\" (1.549 m)      Head Circumference       Peak Flow       Pain Score       Pain Loc       Pain Edu? Excl. in [address]? GENERAL:  Awake, alert.  Well developed, well nourished with no apparent distress. HENT:  Normocephalic, Atraumatic, moist mucous membranes. EYES:  Pupils equal round and reactive to light, Conjunctiva normal, extraocular movements normal.  NECK:  No meningeal signs, Supple. CHEST:  Regular rate and rhythm, chest wall non-tender. LUNGS:  Clear to auscultation bilaterally. ABDOMEN:  Soft, midepigastric tenderness, no rebound, rigidity or guarding, non-distended, normal bowel sounds. No costovertebral angle tenderness to palpation. BACK:  No tenderness. EXTREMITIES:  Normal range of motion, no edema, no bony tenderness, no deformity, distal pulses present. SKIN: Warm, dry and intact. NEUROLOGIC: Normal mental status. Moving all extremities to command. LABS  Labs Reviewed   COMPREHENSIVE METABOLIC PANEL - Abnormal; Notable for the following components:       Result Value    Sodium 135 (*)     Potassium 3.3 (*)     CREATININE <0.5 (*)     AST 13 (*)     All other components within normal limits    Narrative:     Performed at:  [address],  [address] [place] [address], [address]   Phone [phone]   CBC WITH AUTO DIFFERENTIAL    Narrative:     Performed at:  [address],  [address] [place] [address], [address]   Phone [phone]   HCG, SERUM, QUALITATIVE    Narrative:     Performed at:  [address],  [address] [place] [address], [address]   Phone [phone]   URINALYSIS    Narrative:     Performed at:  [address],  [address] [place] [address], [address]   Phone [phone]         [facility]         Patient had resolution of her pain with medication received in the ER. I advised the patient to return to the emergency department immediately for any new or worsening symptoms, such as fever, vomiting or increased pain. The patient voiced agreement and understanding of the treatment plan.       I estimate there is LOW risk for ACUTE APPENDICITIS, BOWEL OBSTRUCTION, CHOLECYSTITIS, DIVERTICULITIS, INCARCERATED HERNIA, PANCREATITIS, or PERFORATED BOWEL or ULCER, thus I consider the discharge disposition reasonable. Also, there is no evidence or peritonitis, sepsis, or toxicity. Jono Gordon and I have discussed the diagnosis and risks, and we agree with discharging home to follow-up with their primary doctor. We also discussed returning to the Emergency Department immediately if new or worsening symptoms occur. We have discussed the symptoms which are most concerning (e.g., bloody stool, fever, changing or worsening pain, vomiting) that necessitate immediate return. FINAL Impression    1. Abdominal pain, epigastric        Blood pressure 103/60, pulse 90, temperature 98 °F (36.7 °C), resp. rate 16, height 5' 1\" (1.549 m), weight 135 lb (61.2 kg), last menstrual period 12/18/2008, SpO2 96 %, not currently breastfeeding. Patient was given scripts for the following medications. I counseled patient how to take these medications. Discharge Medication List as of 6/8/2021  2:32 AM      START taking these medications    Details   Hyoscyamine Sulfate SL (LEVSIN/SL) 0.125 MG SUBL 1-2 tabs by mouth every 4-6 hours as needed for abdominal cramps, Disp-30 each, R-0Normal      aluminum & magnesium hydroxide-simethicone (MAALOX ADVANCED MAX ST) 400-400-40 MG/5ML SUSP Take 15 mLs by mouth every 4 hours as needed (nausea or reflux), Disp-1 Bottle, R-0Normal             Disposition  Pt is in good condition upon Discharge to home. This chart was generated using the [address] [place] [address] dictation system. I created this record but it may contain dictation errors.           Farhat Leiva MD  06/08/21 [id number]

## 2024-05-13 ENCOUNTER — RX RENEWAL (OUTPATIENT)
Age: 39
End: 2024-05-13

## 2024-05-23 NOTE — ED ADULT TRIAGE NOTE - SOURCE OF INFORMATION
Patient Body Location Override (Optional - Billing Will Still Be Based On Selected Body Map Location If Applicable): paraspinal upper back Detail Level: Detailed Depth Of Biopsy: dermis Was A Bandage Applied: Yes Size Of Lesion In Cm: 0.7 X Size Of Lesion In Cm: 0 Biopsy Type: H and E Biopsy Method: Dermablade Anesthesia Type: 1% lidocaine with epinephrine Anesthesia Volume In Cc: 0.5 Hemostasis: Drysol Wound Care: Petrolatum Dressing: bandage Destruction After The Procedure: No Type Of Destruction Used: Curettage Curettage Text: The wound bed was treated with curettage after the biopsy was performed. Cryotherapy Text: The wound bed was treated with cryotherapy after the biopsy was performed. Electrodesiccation Text: The wound bed was treated with electrodesiccation after the biopsy was performed. Electrodesiccation And Curettage Text: The wound bed was treated with electrodesiccation and curettage after the biopsy was performed. Silver Nitrate Text: The wound bed was treated with silver nitrate after the biopsy was performed. Lab: [id number] Consent: Written consent was obtained and risks were reviewed including but not limited to scarring, infection, bleeding, scabbing, incomplete removal, nerve damage and allergy to anesthesia. Post-Care Instructions: I reviewed with the patient in detail post-care instructions. Patient is to keep the biopsy site dry overnight, and then apply bacitracin twice daily until healed. Patient may apply hydrogen peroxide soaks to remove any crusting. Notification Instructions: Patient will be notified of biopsy results. However, patient instructed to call the office if not contacted within 2 weeks. Billing Type: Third-Party Bill Information: Selecting Yes will display possible errors in your note based on the variables you have selected. This validation is only offered as a suggestion for you. PLEASE NOTE THAT THE VALIDATION TEXT WILL BE REMOVED WHEN YOU FINALIZE YOUR NOTE. IF YOU WANT TO FAX A PRELIMINARY NOTE YOU WILL NEED TO TOGGLE THIS TO 'NO' IF YOU DO NOT WANT IT IN YOUR FAXED NOTE.

## 2024-07-01 ENCOUNTER — LABORATORY RESULT (OUTPATIENT)
Age: 39
End: 2024-07-01

## 2024-07-01 ENCOUNTER — NON-APPOINTMENT (OUTPATIENT)
Age: 39
End: 2024-07-01

## 2024-07-01 ENCOUNTER — APPOINTMENT (OUTPATIENT)
Dept: CARDIOLOGY | Facility: CLINIC | Age: 39
End: 2024-07-01
Payer: COMMERCIAL

## 2024-07-01 VITALS
HEART RATE: 77 BPM | DIASTOLIC BLOOD PRESSURE: 80 MMHG | SYSTOLIC BLOOD PRESSURE: 126 MMHG | BODY MASS INDEX: 29.95 KG/M2 | HEIGHT: 73 IN | TEMPERATURE: 98.3 F | RESPIRATION RATE: 16 BRPM | OXYGEN SATURATION: 97 % | WEIGHT: 226 LBS

## 2024-07-01 DIAGNOSIS — I07.1 RHEUMATIC TRICUSPID INSUFFICIENCY: ICD-10-CM

## 2024-07-01 DIAGNOSIS — E11.9 TYPE 2 DIABETES MELLITUS W/OUT COMPLICATIONS: ICD-10-CM

## 2024-07-01 DIAGNOSIS — I10 ESSENTIAL (PRIMARY) HYPERTENSION: ICD-10-CM

## 2024-07-01 DIAGNOSIS — I34.0 NONRHEUMATIC MITRAL (VALVE) INSUFFICIENCY: ICD-10-CM

## 2024-07-01 DIAGNOSIS — Z86.79 PERSONAL HISTORY OF OTHER DISEASES OF THE CIRCULATORY SYSTEM: ICD-10-CM

## 2024-07-01 PROCEDURE — G2211 COMPLEX E/M VISIT ADD ON: CPT | Mod: NC

## 2024-07-01 PROCEDURE — 93000 ELECTROCARDIOGRAM COMPLETE: CPT

## 2024-07-01 PROCEDURE — 99214 OFFICE O/P EST MOD 30 MIN: CPT | Mod: 25

## 2024-07-03 RX ORDER — POTASSIUM CHLORIDE 750 MG/1
10 CAPSULE, EXTENDED RELEASE ORAL
Qty: 36 | Refills: 1 | Status: ACTIVE | COMMUNITY
Start: 2024-07-03 | End: 1900-01-01

## 2024-07-08 ENCOUNTER — EMERGENCY (EMERGENCY)
Facility: HOSPITAL | Age: 39
LOS: 0 days | Discharge: HOME | End: 2024-07-09
Attending: EMERGENCY MEDICINE
Payer: COMMERCIAL

## 2024-07-08 VITALS
TEMPERATURE: 98 F | OXYGEN SATURATION: 98 % | HEIGHT: 73 IN | DIASTOLIC BLOOD PRESSURE: 74 MMHG | SYSTOLIC BLOOD PRESSURE: 116 MMHG | HEART RATE: 84 BPM | RESPIRATION RATE: 17 BRPM | WEIGHT: 225.97 LBS

## 2024-07-08 DIAGNOSIS — R51.9 HEADACHE, UNSPECIFIED: ICD-10-CM

## 2024-07-08 DIAGNOSIS — M54.6 PAIN IN THORACIC SPINE: ICD-10-CM

## 2024-07-08 DIAGNOSIS — M54.2 CERVICALGIA: ICD-10-CM

## 2024-07-08 DIAGNOSIS — I10 ESSENTIAL (PRIMARY) HYPERTENSION: ICD-10-CM
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
## 2024-07-08 DIAGNOSIS — Z87.81 PERSONAL HISTORY OF (HEALED) TRAUMATIC FRACTURE: Chronic | ICD-10-CM
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
## 2024-07-08 DIAGNOSIS — M54.50 LOW BACK PAIN, UNSPECIFIED: ICD-10-CM

## 2024-07-08 DIAGNOSIS — Z98.890 OTHER SPECIFIED POSTPROCEDURAL STATES: Chronic | ICD-10-CM

## 2024-07-08 DIAGNOSIS — E11.9 TYPE 2 DIABETES MELLITUS WITHOUT COMPLICATIONS: ICD-10-CM

## 2024-07-08 PROCEDURE — 99284 EMERGENCY DEPT VISIT MOD MDM: CPT

## 2024-07-09 VITALS
HEART RATE: 65 BPM | OXYGEN SATURATION: 98 % | RESPIRATION RATE: 18 BRPM | DIASTOLIC BLOOD PRESSURE: 74 MMHG | TEMPERATURE: 98 F | SYSTOLIC BLOOD PRESSURE: 115 MMHG

## 2024-07-09 PROCEDURE — 72100 X-RAY EXAM L-S SPINE 2/3 VWS: CPT | Mod: 26

## 2024-07-09 PROCEDURE — 72040 X-RAY EXAM NECK SPINE 2-3 VW: CPT | Mod: 26

## 2024-07-09 PROCEDURE — 72070 X-RAY EXAM THORAC SPINE 2VWS: CPT | Mod: 26

## 2024-07-09 RX ORDER — METHOCARBAMOL 500 MG
750 TABLET ORAL ONCE
Refills: 0 | Status: COMPLETED | OUTPATIENT
Start: 2024-07-09 | End: 2024-07-09

## 2024-07-09 RX ORDER — LIDOCAINE HCL 28 MG/G
1 GEL TOPICAL ONCE
Refills: 0 | Status: COMPLETED | OUTPATIENT
Start: 2024-07-09 | End: 2024-07-09

## 2024-07-09 RX ORDER — METHOCARBAMOL 500 MG
2 TABLET ORAL
Qty: 40 | Refills: 0
Start: 2024-07-09 | End: 2024-07-13

## 2024-07-09 RX ADMIN — Medication 600 MILLIGRAM(S): at 00:46

## 2024-07-09 RX ADMIN — Medication 750 MILLIGRAM(S): at 00:46

## 2024-07-09 RX ADMIN — LIDOCAINE HCL 1 PATCH: 28 GEL TOPICAL at 00:46

## 2024-11-25 ENCOUNTER — RX RENEWAL (OUTPATIENT)
Age: 39
End: 2024-11-25

## 2024-12-10 ENCOUNTER — APPOINTMENT (OUTPATIENT)
Dept: CARDIOLOGY | Facility: CLINIC | Age: 39
End: 2024-12-10

## 2024-12-21 ENCOUNTER — APPOINTMENT (OUTPATIENT)
Dept: CARDIOLOGY | Facility: CLINIC | Age: 39
End: 2024-12-21

## 2024-12-21 PROCEDURE — 93306 TTE W/DOPPLER COMPLETE: CPT

## 2024-12-23 ENCOUNTER — APPOINTMENT (OUTPATIENT)
Dept: CARDIOLOGY | Facility: CLINIC | Age: 39
End: 2024-12-23

## 2025-02-06 NOTE — ED ADULT TRIAGE NOTE - TEMPERATURE IN FAHRENHEIT (DEGREES F)
Physical Therapy    Discharge Summary    Name: Milagros Parra  MRN: [id number]  : 1973  Date: 24    Discharge Summary: PT    Discharge Information: Date of evaluation 10/29/2024    Therapy Summary: Pt with slow but good progress during therapy and has met her goals. She has improved gait and normal left LE strength. She is able to complete work and daily activities without difficulty.     Discharge Status: cont with HEP on own     Rehab Discharge Reason: Achieved all and/or the most significant goals(s)  
97.7

## 2025-07-22 ENCOUNTER — RX RENEWAL (OUTPATIENT)
Age: 40
End: 2025-07-22

## 2025-08-08 ENCOUNTER — APPOINTMENT (OUTPATIENT)
Dept: CARDIOLOGY | Facility: CLINIC | Age: 40
End: 2025-08-08

## 2025-08-25 ENCOUNTER — APPOINTMENT (OUTPATIENT)
Dept: CARDIOLOGY | Facility: CLINIC | Age: 40
End: 2025-08-25
Payer: COMMERCIAL

## 2025-08-25 ENCOUNTER — LABORATORY RESULT (OUTPATIENT)
Age: 40
End: 2025-08-25

## 2025-08-25 VITALS
DIASTOLIC BLOOD PRESSURE: 92 MMHG | OXYGEN SATURATION: 98 % | WEIGHT: 232 LBS | SYSTOLIC BLOOD PRESSURE: 130 MMHG | HEART RATE: 70 BPM | TEMPERATURE: 97.6 F | HEIGHT: 73 IN | BODY MASS INDEX: 30.75 KG/M2 | RESPIRATION RATE: 16 BRPM

## 2025-08-25 DIAGNOSIS — I34.0 NONRHEUMATIC MITRAL (VALVE) INSUFFICIENCY: ICD-10-CM

## 2025-08-25 DIAGNOSIS — R73.03 PREDIABETES.: ICD-10-CM

## 2025-08-25 DIAGNOSIS — I10 ESSENTIAL (PRIMARY) HYPERTENSION: ICD-10-CM

## 2025-08-25 DIAGNOSIS — I07.1 RHEUMATIC TRICUSPID INSUFFICIENCY: ICD-10-CM

## 2025-08-25 PROCEDURE — G2211 COMPLEX E/M VISIT ADD ON: CPT | Mod: NC

## 2025-08-25 PROCEDURE — 99214 OFFICE O/P EST MOD 30 MIN: CPT

## 2025-08-25 PROCEDURE — 93000 ELECTROCARDIOGRAM COMPLETE: CPT
